# Patient Record
Sex: FEMALE | Race: WHITE | NOT HISPANIC OR LATINO | Employment: FULL TIME | URBAN - METROPOLITAN AREA
[De-identification: names, ages, dates, MRNs, and addresses within clinical notes are randomized per-mention and may not be internally consistent; named-entity substitution may affect disease eponyms.]

---

## 2018-07-10 ENCOUNTER — TELEPHONE (OUTPATIENT)
Dept: ENDOCRINOLOGY | Facility: HOSPITAL | Age: 58
End: 2018-07-10

## 2018-07-10 DIAGNOSIS — E04.1 THYROID NODULE: ICD-10-CM

## 2018-07-10 DIAGNOSIS — E03.9 HYPOTHYROIDISM, ADULT: Primary | ICD-10-CM

## 2018-07-10 NOTE — TELEPHONE ENCOUNTER
Patient needs new lab slip for t4 free, tsh with diagnosis codes     E04 1, E03 9    Mail to patients home

## 2018-11-26 ENCOUNTER — OFFICE VISIT (OUTPATIENT)
Dept: ENDOCRINOLOGY | Facility: HOSPITAL | Age: 58
End: 2018-11-26
Payer: COMMERCIAL

## 2018-11-26 VITALS
HEART RATE: 83 BPM | WEIGHT: 168.4 LBS | HEIGHT: 63 IN | DIASTOLIC BLOOD PRESSURE: 86 MMHG | BODY MASS INDEX: 29.84 KG/M2 | SYSTOLIC BLOOD PRESSURE: 122 MMHG

## 2018-11-26 DIAGNOSIS — E03.9 HYPOTHYROIDISM, ADULT: Primary | ICD-10-CM

## 2018-11-26 DIAGNOSIS — E04.1 THYROID NODULE: ICD-10-CM

## 2018-11-26 PROCEDURE — 99243 OFF/OP CNSLTJ NEW/EST LOW 30: CPT | Performed by: INTERNAL MEDICINE

## 2018-11-26 RX ORDER — MULTIVIT WITH MINERALS/LUTEIN
1000 TABLET ORAL DAILY
COMMUNITY

## 2018-11-26 RX ORDER — MAG HYDROX/ALUMINUM HYD/SIMETH 400-400-40
SUSPENSION, ORAL (FINAL DOSE FORM) ORAL DAILY
COMMUNITY

## 2018-11-26 RX ORDER — LEVOTHYROXINE SODIUM 0.05 MG/1
50 TABLET ORAL
COMMUNITY
Start: 2014-09-18 | End: 2020-04-15 | Stop reason: DRUGHIGH

## 2018-11-26 RX ORDER — CALCIUM CARBONATE/VITAMIN D3 600 MG-10
TABLET ORAL DAILY
COMMUNITY

## 2018-11-26 NOTE — PATIENT INSTRUCTIONS
Thyroid blood work is normal   No change in the levothyroxine 50 mcg daily  Follow up in 1 year with blood work and thyroid ultrasound

## 2018-11-26 NOTE — LETTER
November 26, 2018     [address] [place] [address]    Patient: Felicita Echevarria   YOB: 1960   Date of Visit: 11/26/2018       Dear Dr Rajesh Oakley: Thank you for referring Trinity Del Cid to me for evaluation  Below are my notes for this consultation  If you have questions, please do not hesitate to call me  I look forward to following your patient along with you  Sincerely,        Zhane Canales MD        CC: No Recipients  Zhane Canales MD  11/26/2018 12:11 PM  Sign at close encounter  11/26/2018    Assessment/Plan      Diagnoses and all orders for this visit:    Hypothyroidism, adult  -     TSH, 3rd generation Lab Collect; Future  -     Thyroid Antibodies Panel Lab Collect; Future  -     T4, free Lab Collect; Future  -     TSH, 3rd generation Lab Collect  -     Thyroid Antibodies Panel Lab Collect  -     T4, free Lab Collect  -     US thyroid; Future    Thyroid nodule  -     TSH, 3rd generation Lab Collect; Future  -     Thyroid Antibodies Panel Lab Collect; Future  -     T4, free Lab Collect; Future  -     TSH, 3rd generation Lab Collect  -     Thyroid Antibodies Panel Lab Collect  -     T4, free Lab Collect  -     US thyroid; Future    Other orders  -     DEXILANT 60 MG capsule; Take 1 capsule by mouth daily  -     levothyroxine 50 mcg tablet; Take 50 mcg by mouth  -     Omega 3 1200 MG CAPS; Take by mouth daily    -     Cholecalciferol (VITAMIN D3) 5000 units CAPS; Take by mouth daily    -     Ascorbic Acid (VITAMIN C) 1000 MG tablet; Take 1,000 mg by mouth daily  -     ASTRAGALUS PO; Take 470 mg by mouth 2 (two) times a day    -     MISC NATURAL PRODUCTS PO; Take by mouth Adren-plus supplement once daily  -     TURMERIC CURCUMIN PO; Take 750 Devices by mouth daily  -     MISC NATURAL PRODUCTS PO; Take by mouth oreganix mushrooms once daily        Assessment/Plan:  1  Hypothyroidism    Most recent thyroid function tests are normal  She is biochemically euthyroid  I will have her continue the same levothyroxine 50 mcg daily  2   Small right-sided thyroid nodule  It is been stable in size in the past   I will repeat an ultrasound next year  I have asked her to follow up in 1 year with preceding TSH, free T4, thyroid antibody panel, and thyroid ultrasound  CC: Thyroid Consult    History of Present Illness     HPI: oJse Gonzalez is a 63y o  year old female with history of hypothyroidism and history of small right-sided thyroid nodule  In 2016, she was being evaluated after recent diagnosis of non-Hodgkin's lymphoma and CT and PET scan showed abnormalities of the thyroid for which an ultrasound was then done showing a thyroid nodule  The nodules were very small in size and biopsy was not necessary  She was found to be hypothyroid and is on levothyroxine 50 mcg daily  She denies heat or cold intolerance, diarrhea or constipation, palpitation, tremors, anxiety or depression, or fatigue  She does only get about 5 hours of sleep a night  She has some dry skin and brittle nails but no hair loss  She has gained about 15 lb since November of 2017 though she says she gained most of it over the summer  She denies diplopia  She is no trouble swallowing or compressive thyroid symptoms  She has no history of head or neck irradiation in the past     Review of Systems   Constitutional: Positive for unexpected weight change  Negative for fatigue  Weight up 15 lbs since November 2017  Weight has gone up since summer 2018  HENT: Negative for hearing loss, tinnitus and trouble swallowing  No XRT to head or neck in the past    Eyes: Negative for visual disturbance  Wears glasses  No diplopia  Respiratory: Negative for chest tightness and shortness of breath  Cardiovascular: Negative for chest pain, palpitations and leg swelling  Gastrointestinal: Positive for abdominal pain  Negative for constipation, diarrhea and nausea          Some abdominal pain in epigastric area  Endocrine: Negative for cold intolerance and heat intolerance  Genitourinary:        No menses since age 53  Musculoskeletal: Negative for arthralgias and back pain  Skin: Negative for rash  Has some dry skin  Has some brittle nails  No hair loss  Neurological: Positive for numbness  Negative for dizziness, tremors, light-headedness and headaches  Has numbness and tingling at times in the hands  Some feet pain when gets up in am with numbness and tingling  Psychiatric/Behavioral: Positive for sleep disturbance  Negative for dysphoric mood  The patient is not nervous/anxious  Has poor sleep in general  Has only been able to sleep 5 hours a night         Historical Information   Past Medical History:   Diagnosis Date    GERD (gastroesophageal reflux disease)     Lymphoma, non-Hodgkin's (HCC)     treated with Chemo, Follicular lymphoma, 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    Osteoarthritis      Past Surgical History:   Procedure Laterality Date     SECTION      X 3    KNEE ARTHROSCOPY Bilateral     LYMPH NODE BIOPSY Left     groin    PORTACATH PLACEMENT Left     TONSILLECTOMY      TUBAL LIGATION Bilateral     WISDOM TOOTH EXTRACTION       Social History   History   Alcohol Use    Yes     Comment: Occasional wine     History   Drug Use No     History   Smoking Status    Never Smoker   Smokeless Tobacco    Never Used     Family History:   Family History   Problem Relation Age of Onset    Dementia Mother     Diabetes unspecified Father     Cancer Father         bladder    Parkinsonism Father     Hypothyroidism Father     Neuropathy Father         peripheral    Breast cancer Maternal Grandmother     Thyroid disease unspecified Sister         post thyroidectomy    Arthritis Brother     Colon cancer Paternal Grandmother     Myasthenia gravis Sister     No Known Problems Daughter     No Known Problems Daughter     Arthritis Son Meds/Allergies   Current Outpatient Prescriptions   Medication Sig Dispense Refill    Ascorbic Acid (VITAMIN C) 1000 MG tablet Take 1,000 mg by mouth daily      ASTRAGALUS PO Take 470 mg by mouth 2 (two) times a day        Cholecalciferol (VITAMIN D3) 5000 units CAPS Take by mouth daily        DEXILANT 60 MG capsule Take 1 capsule by mouth daily  11    levothyroxine 50 mcg tablet Take 50 mcg by mouth      MISC NATURAL PRODUCTS PO Take by mouth Adren-plus supplement once daily      MISC NATURAL PRODUCTS PO Take by mouth oreganix mushrooms once daily      Omega 3 1200 MG CAPS Take by mouth daily        TURMERIC CURCUMIN PO Take 750 Devices by mouth daily       No current facility-administered medications for this visit  Allergies   Allergen Reactions    Meperidine      Other reaction(s): Other (See Comments)  Hallucinations    Oxycodone-Acetaminophen      Other reaction(s): GI upset       Objective   Vitals: Blood pressure 122/86, pulse 83, height 5' 3" (1 6 m), weight 76 4 kg (168 lb 6 4 oz)  Invasive Devices          No matching active lines, drains, or airways          Physical Exam   Constitutional: She is oriented to person, place, and time  She appears well-developed and well-nourished  HENT:   Head: Normocephalic and atraumatic  Eyes: Pupils are equal, round, and reactive to light  Conjunctivae and EOM are normal    No lid lag, stare, proptosis, or periorbital edema  Neck: Normal range of motion  Neck supple  No thyromegaly present  No palpable thyroid nodules  Thyroid irregular in feel  No bruits over the thyroid gland or carotids  Cardiovascular: Normal rate, regular rhythm, normal heart sounds and intact distal pulses  No murmur heard  Pulmonary/Chest: Effort normal and breath sounds normal  She has no wheezes  Abdominal: Soft  Bowel sounds are normal  There is no tenderness  Musculoskeletal: Normal range of motion  She exhibits no edema or deformity     No tremor of the outstretched hands  No spinous process tenderness  No CVA tenderness  Lymphadenopathy:     She has no cervical adenopathy  Neurological: She is alert and oriented to person, place, and time  She has normal reflexes  Skin: Skin is warm and dry  No rash noted  Vitals reviewed  The history was obtained from the review of the chart and from the patient  Lab Results:    Blood work done at [facility] on 11/20/2018 showed a TSH of 1 91 with a free T4 1  12  No results found for this or any previous visit (from the past 00906 hour(s))        Future Appointments  Date Time Provider Samia Kauffman   11/26/2019 9:00 AM Daun Spatz, MD [facility]

## 2018-11-26 NOTE — PROGRESS NOTES
11/26/2018    Assessment/Plan      Diagnoses and all orders for this visit:    Hypothyroidism, adult  -     TSH, 3rd generation Lab Collect; Future  -     Thyroid Antibodies Panel Lab Collect; Future  -     T4, free Lab Collect; Future  -     TSH, 3rd generation Lab Collect  -     Thyroid Antibodies Panel Lab Collect  -     T4, free Lab Collect  -     US thyroid; Future    Thyroid nodule  -     TSH, 3rd generation Lab Collect; Future  -     Thyroid Antibodies Panel Lab Collect; Future  -     T4, free Lab Collect; Future  -     TSH, 3rd generation Lab Collect  -     Thyroid Antibodies Panel Lab Collect  -     T4, free Lab Collect  -     US thyroid; Future    Other orders  -     DEXILANT 60 MG capsule; Take 1 capsule by mouth daily  -     levothyroxine 50 mcg tablet; Take 50 mcg by mouth  -     Omega 3 1200 MG CAPS; Take by mouth daily    -     Cholecalciferol (VITAMIN D3) 5000 units CAPS; Take by mouth daily    -     Ascorbic Acid (VITAMIN C) 1000 MG tablet; Take 1,000 mg by mouth daily  -     ASTRAGALUS PO; Take 470 mg by mouth 2 (two) times a day    -     MISC NATURAL PRODUCTS PO; Take by mouth Adren-plus supplement once daily  -     TURMERIC CURCUMIN PO; Take 750 Devices by mouth daily  -     MISC NATURAL PRODUCTS PO; Take by mouth oreganix mushrooms once daily        Assessment/Plan:  1  Hypothyroidism  Most recent thyroid function tests are normal  She is biochemically euthyroid  I will have her continue the same levothyroxine 50 mcg daily  2   Small right-sided thyroid nodule  It is been stable in size in the past   I will repeat an ultrasound next year  I have asked her to follow up in 1 year with preceding TSH, free T4, thyroid antibody panel, and thyroid ultrasound  CC: Thyroid Consult    History of Present Illness     HPI: Salvador Napier is a 63y o  year old female with history of hypothyroidism and history of small right-sided thyroid nodule    In 2016, she was being evaluated after recent diagnosis of non-Hodgkin's lymphoma and CT and PET scan showed abnormalities of the thyroid for which an ultrasound was then done showing a thyroid nodule  The nodules were very small in size and biopsy was not necessary  She was found to be hypothyroid and is on levothyroxine 50 mcg daily  She denies heat or cold intolerance, diarrhea or constipation, palpitation, tremors, anxiety or depression, or fatigue  She does only get about 5 hours of sleep a night  She has some dry skin and brittle nails but no hair loss  She has gained about 15 lb since November of 2017 though she says she gained most of it over the summer  She denies diplopia  She is no trouble swallowing or compressive thyroid symptoms  She has no history of head or neck irradiation in the past     Review of Systems   Constitutional: Positive for unexpected weight change  Negative for fatigue  Weight up 15 lbs since November 2017  Weight has gone up since summer 2018  HENT: Negative for hearing loss, tinnitus and trouble swallowing  No XRT to head or neck in the past    Eyes: Negative for visual disturbance  Wears glasses  No diplopia  Respiratory: Negative for chest tightness and shortness of breath  Cardiovascular: Negative for chest pain, palpitations and leg swelling  Gastrointestinal: Positive for abdominal pain  Negative for constipation, diarrhea and nausea  Some abdominal pain in epigastric area  Endocrine: Negative for cold intolerance and heat intolerance  Genitourinary:        No menses since age 53  Musculoskeletal: Negative for arthralgias and back pain  Skin: Negative for rash  Has some dry skin  Has some brittle nails  No hair loss  Neurological: Positive for numbness  Negative for dizziness, tremors, light-headedness and headaches  Has numbness and tingling at times in the hands  Some feet pain when gets up in am with numbness and tingling     Psychiatric/Behavioral: Positive for sleep disturbance  Negative for dysphoric mood  The patient is not nervous/anxious  Has poor sleep in general  Has only been able to sleep 5 hours a night         Historical Information   Past Medical History:   Diagnosis Date    GERD (gastroesophageal reflux disease)     Lymphoma, non-Hodgkin's (HCC)     treated with Chemo, Follicular lymphoma, 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    Osteoarthritis      Past Surgical History:   Procedure Laterality Date     SECTION      X 3    KNEE ARTHROSCOPY Bilateral     LYMPH NODE BIOPSY Left     groin    PORTACATH PLACEMENT Left     TONSILLECTOMY      TUBAL LIGATION Bilateral     WISDOM TOOTH EXTRACTION       Social History   History   Alcohol Use    Yes     Comment: Occasional wine     History   Drug Use No     History   Smoking Status    Never Smoker   Smokeless Tobacco    Never Used     Family History:   Family History   Problem Relation Age of Onset    Dementia Mother     Diabetes unspecified Father     Cancer Father         bladder    Parkinsonism Father     Hypothyroidism Father     Neuropathy Father         peripheral    Breast cancer Maternal Grandmother     Thyroid disease unspecified Sister         post thyroidectomy    Arthritis Brother     Colon cancer Paternal Grandmother     Myasthenia gravis Sister     No Known Problems Daughter     No Known Problems Daughter     Arthritis Son        Meds/Allergies   Current Outpatient Prescriptions   Medication Sig Dispense Refill    Ascorbic Acid (VITAMIN C) 1000 MG tablet Take 1,000 mg by mouth daily      ASTRAGALUS PO Take 470 mg by mouth 2 (two) times a day        Cholecalciferol (VITAMIN D3) 5000 units CAPS Take by mouth daily        DEXILANT 60 MG capsule Take 1 capsule by mouth daily  11    levothyroxine 50 mcg tablet Take 50 mcg by mouth      MISC NATURAL PRODUCTS PO Take by mouth Adren-plus supplement once daily      MISC NATURAL PRODUCTS PO Take by mouth oreganix mushrooms once daily      Omega 3 1200 MG CAPS Take by mouth daily        TURMERIC CURCUMIN PO Take 750 Devices by mouth daily       No current facility-administered medications for this visit  Allergies   Allergen Reactions    Meperidine      Other reaction(s): Other (See Comments)  Hallucinations    Oxycodone-Acetaminophen      Other reaction(s): GI upset       Objective   Vitals: Blood pressure 122/86, pulse 83, height 5' 3" (1 6 m), weight 76 4 kg (168 lb 6 4 oz)  Invasive Devices          No matching active lines, drains, or airways          Physical Exam   Constitutional: She is oriented to person, place, and time  She appears well-developed and well-nourished  HENT:   Head: Normocephalic and atraumatic  Eyes: Pupils are equal, round, and reactive to light  Conjunctivae and EOM are normal    No lid lag, stare, proptosis, or periorbital edema  Neck: Normal range of motion  Neck supple  No thyromegaly present  No palpable thyroid nodules  Thyroid irregular in feel  No bruits over the thyroid gland or carotids  Cardiovascular: Normal rate, regular rhythm, normal heart sounds and intact distal pulses  No murmur heard  Pulmonary/Chest: Effort normal and breath sounds normal  She has no wheezes  Abdominal: Soft  Bowel sounds are normal  There is no tenderness  Musculoskeletal: Normal range of motion  She exhibits no edema or deformity  No tremor of the outstretched hands  No spinous process tenderness  No CVA tenderness  Lymphadenopathy:     She has no cervical adenopathy  Neurological: She is alert and oriented to person, place, and time  She has normal reflexes  Skin: Skin is warm and dry  No rash noted  Vitals reviewed  The history was obtained from the review of the chart and from the patient  Lab Results:    Blood work done at [facility] on 11/20/2018 showed a TSH of 1 91 with a free T4 1  12        No results found for this or any previous visit (from the past 87128 hour(s))        Future Appointments  Date Time Provider Samia Kauffman   11/26/2019 9:00 AM Cherry Shah MD [facility]

## 2019-12-04 DIAGNOSIS — E03.9 HYPOTHYROIDISM, ADULT: Primary | ICD-10-CM

## 2019-12-04 DIAGNOSIS — E04.1 THYROID NODULE: ICD-10-CM

## 2019-12-09 DIAGNOSIS — E03.9 HYPOTHYROIDISM, ADULT: Primary | ICD-10-CM

## 2019-12-19 ENCOUNTER — OFFICE VISIT (OUTPATIENT)
Dept: ENDOCRINOLOGY | Facility: HOSPITAL | Age: 59
End: 2019-12-19
Payer: COMMERCIAL

## 2019-12-19 VITALS
HEART RATE: 80 BPM | DIASTOLIC BLOOD PRESSURE: 84 MMHG | BODY MASS INDEX: 31.89 KG/M2 | HEIGHT: 63 IN | SYSTOLIC BLOOD PRESSURE: 122 MMHG | WEIGHT: 180 LBS

## 2019-12-19 DIAGNOSIS — E04.1 THYROID NODULE: ICD-10-CM

## 2019-12-19 DIAGNOSIS — E03.9 HYPOTHYROIDISM, ADULT: Primary | ICD-10-CM

## 2019-12-19 PROCEDURE — 99214 OFFICE O/P EST MOD 30 MIN: CPT | Performed by: INTERNAL MEDICINE

## 2019-12-19 RX ORDER — MELOXICAM 15 MG/1
15 TABLET ORAL DAILY
COMMUNITY
End: 2020-12-08 | Stop reason: ALTCHOICE

## 2019-12-19 NOTE — PATIENT INSTRUCTIONS
The thyroid ultrasound shows stable size small thyroid nodules that are not worrisome  the thyroid levels are more on the underactive side of normal   We can try increasing the levothyroxine to 50 mcg 1 tablet 6 days a week and 1 5 tablets on Sunday to see if it helps  Repeat blood work in 3 months  Call about 1 week afterwards for results  Follow up in 1 year with blood work

## 2019-12-19 NOTE — PROGRESS NOTES
12/20/2019    Assessment/Plan      Diagnoses and all orders for this visit:    Hypothyroidism, adult  -     TSH, 3rd generation Lab Collect; Future  -     T4, free Lab Collect; Future  -     TSH, 3rd generation Lab Collect  -     T4, free Lab Collect  -     TSH, 3rd generation Lab Collect; Future  -     T4, free Lab Collect; Future  -     TSH, 3rd generation Lab Collect  -     T4, free Lab Collect    Thyroid nodule  -     TSH, 3rd generation Lab Collect; Future  -     T4, free Lab Collect; Future  -     TSH, 3rd generation Lab Collect  -     T4, free Lab Collect  -     TSH, 3rd generation Lab Collect; Future  -     T4, free Lab Collect; Future  -     TSH, 3rd generation Lab Collect  -     T4, free Lab Collect    Other orders  -     meloxicam (MOBIC) 15 mg tablet; Take 15 mg by mouth daily  -     Magnesium 100 MG CAPS; Take by mouth        Assessment/Plan:  1  Hypothyroidism  Most recent thyroid function tests do show her TSH is on the under active side of normal which has increased some and given her symptoms I will try increasing her levothyroxine to 50 mcg 1 tablet 6 days a week and 1 5 tablets on Sunday  She will repeat her TSH with free T4 in 3 months and call about a week afterwards for results to see if any further adjustments need to be made  2  Thyroid nodule  Thyroid nodules have been too small to biopsy in the past   Most recent thyroid ultrasound does show stable size thyroid nodules  I have asked her to follow up in 1 year with preceding TSH and free T4       CC:  Hypothyroid and thyroid nodule follow-up    History of Present Illness     HPI: Gaurang Nascimento is a 63y o  year old female with history of hypothyroidism and right thyroid nodule that is quite small here for follow-up  In 2016, she was noted to have an abnormal thyroid ultrasound during a workup for non Hodgkin's lymphoma  Nodules were small biopsy was not necessary    Hypothyroidism was found at the same time and she was started on thyroid hormone  She is currently on levothyroxine 50 mcg daily  Weight is 12 lb more than last year  She has some hot flashes  She has dry skin and patches in brittle nails  She wakes after about 6 hours and can get back to sleep  She denies heat or cold intolerance, tremors, palpitation, fatigue, anxiety or depression, diarrhea or constipation, or hair loss  She denies diplopia  She has no compressive thyroid symptoms or difficulties with swallowing  Review of Systems   Constitutional: Positive for unexpected weight change  Negative for fatigue  Weight 12 lb more than last year  HENT: Negative for trouble swallowing  Eyes: Negative for visual disturbance  Wears glasses  Respiratory: Positive for shortness of breath  Negative for chest tightness  More breathless and wheezing with extra weight when exercising  Cardiovascular: Negative for chest pain and palpitations  Gastrointestinal: Negative for abdominal pain, constipation, diarrhea and nausea  Endocrine: Negative for cold intolerance and heat intolerance  Some hot flashes  Genitourinary:        Postmenopausal for 9 years  Skin: Negative for rash  Has dry skin patches  Has brittle nails  No hair loss  Neurological: Positive for numbness  Negative for dizziness, tremors, light-headedness and headaches  Hands numb and tingling with pain, reportedly neck issue, seeing chiropractor  Psychiatric/Behavioral: Negative for dysphoric mood and sleep disturbance  The patient is not nervous/anxious  Only sleeps 6 1/2 hours and then up         Historical Information   Past Medical History:   Diagnosis Date    Achilles tendon disorder, left     GERD (gastroesophageal reflux disease)     Lymphoma, non-Hodgkin's (HCC)     treated with Chemo, Follicular lymphoma, 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    Osteoarthritis      Past Surgical History:   Procedure Laterality Date     SECTION      X 3    KNEE ARTHROSCOPY Bilateral     LYMPH NODE BIOPSY Left     groin    PORTACATH PLACEMENT Left     TONSILLECTOMY      TUBAL LIGATION Bilateral     WISDOM TOOTH EXTRACTION       Social History   Social History     Substance and Sexual Activity   Alcohol Use Yes    Comment: Occasional wine     Social History     Substance and Sexual Activity   Drug Use No     Social History     Tobacco Use   Smoking Status Never Smoker   Smokeless Tobacco Never Used     Family History:   Family History   Problem Relation Age of Onset    Dementia Mother     Diabetes unspecified Father     Cancer Father         bladder    Parkinsonism Father     Hypothyroidism Father     Neuropathy Father         peripheral    Breast cancer Maternal Grandmother     Thyroid disease unspecified Sister         post thyroidectomy    Arthritis Brother     Colon cancer Paternal Grandmother     Myasthenia gravis Sister     No Known Problems Daughter     No Known Problems Daughter     Arthritis Son        Meds/Allergies   Current Outpatient Medications   Medication Sig Dispense Refill    Ascorbic Acid (VITAMIN C) 1000 MG tablet Take 1,000 mg by mouth daily      Cholecalciferol (VITAMIN D3) 5000 units CAPS Take by mouth daily        DEXILANT 60 MG capsule Take 1 capsule by mouth daily  11    levothyroxine 50 mcg tablet Take 50 mcg by mouth      Magnesium 100 MG CAPS Take by mouth      meloxicam (MOBIC) 15 mg tablet Take 15 mg by mouth daily      MISC NATURAL PRODUCTS PO Take by mouth oreganix mushrooms once daily      Omega 3 1200 MG CAPS Take by mouth daily        TURMERIC CURCUMIN PO Take 750 Devices by mouth daily       No current facility-administered medications for this visit  Allergies   Allergen Reactions    Meperidine      Other reaction(s):  Other (See Comments)  Hallucinations    Oxycodone-Acetaminophen      Other reaction(s): GI upset       Objective   Vitals: Blood pressure 122/84, pulse 80, height 5' 3" (1 6 m), weight 81 6 kg (180 lb)  Invasive Devices     None                 Physical Exam   Constitutional: She is oriented to person, place, and time  She appears well-developed and well-nourished  HENT:   Head: Normocephalic and atraumatic  Eyes: Pupils are equal, round, and reactive to light  Conjunctivae and EOM are normal    No lid lag, stare, proptosis, or periorbital edema  Neck: Normal range of motion  Neck supple  No thyromegaly present  Irregular feeling thyroid  No palpable thyroid nodules  No carotid bruits  Cardiovascular: Normal rate, regular rhythm and normal heart sounds  No murmur heard  Pulmonary/Chest: Effort normal and breath sounds normal  She has no wheezes  Musculoskeletal: Normal range of motion  She exhibits no edema or deformity  No tremor of the outstretched hands  Lymphadenopathy:     She has no cervical adenopathy  Neurological: She is alert and oriented to person, place, and time  She has normal reflexes  Deep tendon reflexes normal    Skin: Skin is warm and dry  No rash noted  Vitals reviewed  The history was obtained from the review of the chart and from the patient  Lab Results:    Blood work done at [facility] on 12/09/2019 showed a TSH of 3 19 with a free T4 of 1 17  Thyroid peroxidase antibodies are less than 10 and thyroglobulin antibodies are less than 20  Thyroid ultrasound done a [address] [place] [address] on 12/09/2019 shows a right lobe of the thyroid measuring 4 7 x 1 5 x 1 1 cm and a left lobe of the thyroid measuring 4 x 1 3 x 1 3 cm  There is a 3 mm rim calcified nodule in the lower pole of the right lobe and a posterior mid pole right lobe nodule measuring 8 mm  Both of these nodules are unchanged in size      Future Appointments   Date Time Provider Samia Kauffman   12/10/2020  8:00 AM Cristal Rico MD [facility]

## 2020-04-15 DIAGNOSIS — E03.9 HYPOTHYROIDISM, ADULT: Primary | ICD-10-CM

## 2020-04-15 RX ORDER — LEVOTHYROXINE SODIUM 0.07 MG/1
75 TABLET ORAL DAILY
Qty: 90 TABLET | Refills: 1 | Status: SHIPPED | OUTPATIENT
Start: 2020-04-15 | End: 2020-09-03

## 2020-05-07 ENCOUNTER — EVALUATION (OUTPATIENT)
Dept: PHYSICAL THERAPY | Facility: CLINIC | Age: 60
End: 2020-05-07
Payer: COMMERCIAL

## 2020-05-07 DIAGNOSIS — M76.62 ACHILLES TENDINITIS OF LEFT LOWER EXTREMITY: Primary | ICD-10-CM

## 2020-05-07 PROCEDURE — 97161 PT EVAL LOW COMPLEX 20 MIN: CPT | Performed by: PHYSICAL THERAPIST

## 2020-05-07 PROCEDURE — 97110 THERAPEUTIC EXERCISES: CPT | Performed by: PHYSICAL THERAPIST

## 2020-05-11 ENCOUNTER — OFFICE VISIT (OUTPATIENT)
Dept: PHYSICAL THERAPY | Facility: CLINIC | Age: 60
End: 2020-05-11
Payer: COMMERCIAL

## 2020-05-11 DIAGNOSIS — M76.62 ACHILLES TENDINITIS OF LEFT LOWER EXTREMITY: Primary | ICD-10-CM

## 2020-05-11 PROCEDURE — 97110 THERAPEUTIC EXERCISES: CPT

## 2020-05-11 PROCEDURE — 97140 MANUAL THERAPY 1/> REGIONS: CPT

## 2020-05-14 ENCOUNTER — OFFICE VISIT (OUTPATIENT)
Dept: PHYSICAL THERAPY | Facility: CLINIC | Age: 60
End: 2020-05-14
Payer: COMMERCIAL

## 2020-05-14 DIAGNOSIS — M76.62 ACHILLES TENDINITIS OF LEFT LOWER EXTREMITY: Primary | ICD-10-CM

## 2020-05-14 PROCEDURE — 97110 THERAPEUTIC EXERCISES: CPT

## 2020-05-14 PROCEDURE — 97140 MANUAL THERAPY 1/> REGIONS: CPT

## 2020-05-18 ENCOUNTER — OFFICE VISIT (OUTPATIENT)
Dept: PHYSICAL THERAPY | Facility: CLINIC | Age: 60
End: 2020-05-18
Payer: COMMERCIAL

## 2020-05-18 DIAGNOSIS — M76.62 ACHILLES TENDINITIS OF LEFT LOWER EXTREMITY: Primary | ICD-10-CM

## 2020-05-18 PROCEDURE — 97110 THERAPEUTIC EXERCISES: CPT

## 2020-05-18 PROCEDURE — 97140 MANUAL THERAPY 1/> REGIONS: CPT

## 2020-05-22 ENCOUNTER — OFFICE VISIT (OUTPATIENT)
Dept: PHYSICAL THERAPY | Facility: CLINIC | Age: 60
End: 2020-05-22
Payer: COMMERCIAL

## 2020-05-22 DIAGNOSIS — M76.62 ACHILLES TENDINITIS OF LEFT LOWER EXTREMITY: Primary | ICD-10-CM

## 2020-05-22 PROCEDURE — 97140 MANUAL THERAPY 1/> REGIONS: CPT

## 2020-05-22 PROCEDURE — 97110 THERAPEUTIC EXERCISES: CPT

## 2020-05-27 ENCOUNTER — OFFICE VISIT (OUTPATIENT)
Dept: PHYSICAL THERAPY | Facility: CLINIC | Age: 60
End: 2020-05-27
Payer: COMMERCIAL

## 2020-05-27 DIAGNOSIS — M76.62 ACHILLES TENDINITIS OF LEFT LOWER EXTREMITY: Primary | ICD-10-CM

## 2020-05-27 PROCEDURE — 97140 MANUAL THERAPY 1/> REGIONS: CPT | Performed by: PHYSICAL THERAPIST

## 2020-05-27 PROCEDURE — 97110 THERAPEUTIC EXERCISES: CPT | Performed by: PHYSICAL THERAPIST

## 2020-05-29 ENCOUNTER — OFFICE VISIT (OUTPATIENT)
Dept: PHYSICAL THERAPY | Facility: CLINIC | Age: 60
End: 2020-05-29
Payer: COMMERCIAL

## 2020-05-29 DIAGNOSIS — M76.62 ACHILLES TENDINITIS OF LEFT LOWER EXTREMITY: Primary | ICD-10-CM

## 2020-05-29 PROCEDURE — 97140 MANUAL THERAPY 1/> REGIONS: CPT

## 2020-05-29 PROCEDURE — 97110 THERAPEUTIC EXERCISES: CPT

## 2020-06-01 ENCOUNTER — OFFICE VISIT (OUTPATIENT)
Dept: PHYSICAL THERAPY | Facility: CLINIC | Age: 60
End: 2020-06-01
Payer: COMMERCIAL

## 2020-06-01 DIAGNOSIS — M76.62 ACHILLES TENDINITIS OF LEFT LOWER EXTREMITY: Primary | ICD-10-CM

## 2020-06-01 PROCEDURE — 97140 MANUAL THERAPY 1/> REGIONS: CPT

## 2020-06-01 PROCEDURE — 97110 THERAPEUTIC EXERCISES: CPT

## 2020-06-04 ENCOUNTER — OFFICE VISIT (OUTPATIENT)
Dept: PHYSICAL THERAPY | Facility: CLINIC | Age: 60
End: 2020-06-04
Payer: COMMERCIAL

## 2020-06-04 DIAGNOSIS — M76.62 ACHILLES TENDINITIS OF LEFT LOWER EXTREMITY: Primary | ICD-10-CM

## 2020-06-04 PROCEDURE — 97140 MANUAL THERAPY 1/> REGIONS: CPT | Performed by: PHYSICAL THERAPIST

## 2020-06-04 PROCEDURE — 97110 THERAPEUTIC EXERCISES: CPT | Performed by: PHYSICAL THERAPIST

## 2020-06-08 ENCOUNTER — OFFICE VISIT (OUTPATIENT)
Dept: PHYSICAL THERAPY | Facility: CLINIC | Age: 60
End: 2020-06-08
Payer: COMMERCIAL

## 2020-06-08 DIAGNOSIS — M76.62 ACHILLES TENDINITIS OF LEFT LOWER EXTREMITY: Primary | ICD-10-CM

## 2020-06-08 PROCEDURE — 97140 MANUAL THERAPY 1/> REGIONS: CPT | Performed by: PHYSICAL THERAPIST

## 2020-06-08 PROCEDURE — 97110 THERAPEUTIC EXERCISES: CPT | Performed by: PHYSICAL THERAPIST

## 2020-06-11 ENCOUNTER — OFFICE VISIT (OUTPATIENT)
Dept: PHYSICAL THERAPY | Facility: CLINIC | Age: 60
End: 2020-06-11
Payer: COMMERCIAL

## 2020-06-11 DIAGNOSIS — M76.62 ACHILLES TENDINITIS OF LEFT LOWER EXTREMITY: Primary | ICD-10-CM

## 2020-06-11 PROCEDURE — 97110 THERAPEUTIC EXERCISES: CPT | Performed by: PHYSICAL THERAPIST

## 2020-06-11 PROCEDURE — 97140 MANUAL THERAPY 1/> REGIONS: CPT | Performed by: PHYSICAL THERAPIST

## 2020-06-16 ENCOUNTER — APPOINTMENT (OUTPATIENT)
Dept: PHYSICAL THERAPY | Facility: CLINIC | Age: 60
End: 2020-06-16
Payer: COMMERCIAL

## 2020-06-18 ENCOUNTER — OFFICE VISIT (OUTPATIENT)
Dept: PHYSICAL THERAPY | Facility: CLINIC | Age: 60
End: 2020-06-18
Payer: COMMERCIAL

## 2020-06-18 DIAGNOSIS — M76.62 ACHILLES TENDINITIS OF LEFT LOWER EXTREMITY: Primary | ICD-10-CM

## 2020-06-18 PROCEDURE — 97110 THERAPEUTIC EXERCISES: CPT

## 2020-06-18 PROCEDURE — 97140 MANUAL THERAPY 1/> REGIONS: CPT

## 2020-06-19 ENCOUNTER — APPOINTMENT (OUTPATIENT)
Dept: PHYSICAL THERAPY | Facility: CLINIC | Age: 60
End: 2020-06-19
Payer: COMMERCIAL

## 2020-06-29 ENCOUNTER — APPOINTMENT (OUTPATIENT)
Dept: PHYSICAL THERAPY | Facility: CLINIC | Age: 60
End: 2020-06-29
Payer: COMMERCIAL

## 2020-07-02 ENCOUNTER — OFFICE VISIT (OUTPATIENT)
Dept: PHYSICAL THERAPY | Facility: CLINIC | Age: 60
End: 2020-07-02
Payer: COMMERCIAL

## 2020-07-02 DIAGNOSIS — M76.62 ACHILLES TENDINITIS OF LEFT LOWER EXTREMITY: Primary | ICD-10-CM

## 2020-07-02 PROCEDURE — 97110 THERAPEUTIC EXERCISES: CPT

## 2020-07-02 PROCEDURE — 97140 MANUAL THERAPY 1/> REGIONS: CPT

## 2020-07-02 NOTE — PROGRESS NOTES
Daily Note     Today's date: 2020  Patient name: Mina Lopez  : 1960  MRN: [id number]  Referring provider: Yuliana Thornton DPM  Dx: No diagnosis found  Subjective: Pt reports her L ankle/foot is feeling better  Notes she has not been compliant w/HEP  Pts mother-in-law  recently, and pt and her  are now responsible for his handicapped sister  Pt reports she rolled over her R ankle yesterday  Objective: See treatment diary below      Assessment: Performed exercise program w/o complaint  Restrictions noted during GT to L calf  Tolerated treatment well  Patient would benefit from continued PT      Plan: Continue per plan of care        Precautions: none      Manuals    IASM to L gastroc/achilles 8' DL 8'    8' 8' KT MD   Laser to achilles insertion 4' 4' 4'    4' 4' 4' 4'   Epimenio Labrum MD  MO    3' 3' KT MD                Neuro Re-Ed                                                                                                        Ther Ex             Bike 8' 8' 8'    8' 8' 8' 8'   Wall stretches (gastroc/soleus) 3x30" ea 3x30" ea 3x30"    3x30"  ea 3x30"  ea 30"x3 ea 30"x3 ea   PF stretch on step 3x30" 3x30" 3x30"    3x30" 3x30" 30"x3 30'x3   Eccentric achilles lowering 6"x20 6"x20 6"x20    6"x12 6"x15 6" x20 6"x20                                                       Ther Activity

## 2020-07-06 ENCOUNTER — OFFICE VISIT (OUTPATIENT)
Dept: PHYSICAL THERAPY | Facility: CLINIC | Age: 60
End: 2020-07-06
Payer: COMMERCIAL

## 2020-07-06 DIAGNOSIS — M76.62 ACHILLES TENDINITIS OF LEFT LOWER EXTREMITY: Primary | ICD-10-CM

## 2020-07-06 PROCEDURE — 97140 MANUAL THERAPY 1/> REGIONS: CPT

## 2020-07-06 PROCEDURE — 97110 THERAPEUTIC EXERCISES: CPT

## 2020-07-06 NOTE — PROGRESS NOTES
Daily Note     Today's date: 2020  Patient name: Oc Hurley  : 1960  MRN: [id number]  Referring provider: Marilu Cook DPM  Dx: No diagnosis found  Subjective: "Alright," adding "it's bothering me more "   Objective: See treatment diary below      Assessment: Performed new exercise and remaining ex program w/o increasing symptoms  Less restrictions noted during GT to L calf  Tolerated treatment well  Patient would benefit from continued PT      Plan: Continue per plan of care        Precautions: none      Manuals    IASM to L gastroc/achilles 8' DL 8' 8'   8' 8' KT MD   Laser to achilles insertion 4' 4' 4' 4'   4' 4' 4' 4'   Gwendolyn Castanon MD  MO MO   3' 3' KT MD                Neuro Re-Ed                                                                                                        Ther Ex             Bike 8' 8' 8' 8'   8' 8' 8' 8'   Wall stretches (gastroc/soleus) 3x30" ea 3x30" ea 3x30" 3x30"  ea   3x30"  ea 3x30"  ea 30"x3 ea 30"x3 ea   PF stretch on step 3x30" 3x30" 3x30" 3x30"   3x30" 3x30" 30"x3 30'x3   Eccentric achilles lowering 6"x20 6"x20 6"x20 6"x20   6"x12 6"x15 6" x20 6"x20   Stretch on incline    3x30"                                                Ther Activity

## 2020-07-08 ENCOUNTER — APPOINTMENT (OUTPATIENT)
Dept: PHYSICAL THERAPY | Facility: CLINIC | Age: 60
End: 2020-07-08
Payer: COMMERCIAL

## 2020-09-02 DIAGNOSIS — E03.9 HYPOTHYROIDISM, ADULT: ICD-10-CM

## 2020-09-03 RX ORDER — LEVOTHYROXINE SODIUM 0.07 MG/1
TABLET ORAL
Qty: 90 TABLET | Refills: 3 | Status: SHIPPED | OUTPATIENT
Start: 2020-09-03 | End: 2021-07-26

## 2020-12-08 ENCOUNTER — OFFICE VISIT (OUTPATIENT)
Dept: ENDOCRINOLOGY | Facility: HOSPITAL | Age: 60
End: 2020-12-08
Payer: COMMERCIAL

## 2020-12-08 VITALS
HEIGHT: 63 IN | SYSTOLIC BLOOD PRESSURE: 110 MMHG | DIASTOLIC BLOOD PRESSURE: 72 MMHG | WEIGHT: 181 LBS | HEART RATE: 104 BPM | BODY MASS INDEX: 32.07 KG/M2

## 2020-12-08 DIAGNOSIS — E04.1 THYROID NODULE: ICD-10-CM

## 2020-12-08 DIAGNOSIS — E03.9 HYPOTHYROIDISM, ADULT: Primary | ICD-10-CM

## 2020-12-08 PROCEDURE — 99213 OFFICE O/P EST LOW 20 MIN: CPT | Performed by: INTERNAL MEDICINE

## 2021-07-23 DIAGNOSIS — E03.9 HYPOTHYROIDISM, ADULT: ICD-10-CM

## 2021-07-26 RX ORDER — LEVOTHYROXINE SODIUM 0.07 MG/1
TABLET ORAL
Qty: 90 TABLET | Refills: 3 | Status: SHIPPED | OUTPATIENT
Start: 2021-07-26

## 2021-12-06 ENCOUNTER — OFFICE VISIT (OUTPATIENT)
Dept: ENDOCRINOLOGY | Facility: HOSPITAL | Age: 61
End: 2021-12-06
Payer: COMMERCIAL

## 2021-12-06 VITALS
SYSTOLIC BLOOD PRESSURE: 120 MMHG | DIASTOLIC BLOOD PRESSURE: 80 MMHG | HEART RATE: 84 BPM | WEIGHT: 185 LBS | HEIGHT: 63 IN | BODY MASS INDEX: 32.78 KG/M2

## 2021-12-06 DIAGNOSIS — E03.9 HYPOTHYROIDISM, ADULT: Primary | ICD-10-CM

## 2021-12-06 DIAGNOSIS — E04.1 THYROID NODULE: ICD-10-CM

## 2021-12-06 PROCEDURE — 99213 OFFICE O/P EST LOW 20 MIN: CPT | Performed by: INTERNAL MEDICINE

## 2021-12-10 ENCOUNTER — OFFICE VISIT (OUTPATIENT)
Dept: GASTROENTEROLOGY | Facility: CLINIC | Age: 61
End: 2021-12-10
Payer: COMMERCIAL

## 2021-12-10 VITALS
WEIGHT: 183 LBS | HEIGHT: 63 IN | SYSTOLIC BLOOD PRESSURE: 112 MMHG | BODY MASS INDEX: 32.43 KG/M2 | DIASTOLIC BLOOD PRESSURE: 80 MMHG

## 2021-12-10 DIAGNOSIS — C85.90 NON-HODGKIN'S LYMPHOMA, UNSPECIFIED BODY REGION, UNSPECIFIED NON-HODGKIN LYMPHOMA TYPE (HCC): ICD-10-CM

## 2021-12-10 DIAGNOSIS — Z12.11 COLON CANCER SCREENING: ICD-10-CM

## 2021-12-10 DIAGNOSIS — K21.9 GASTROESOPHAGEAL REFLUX DISEASE WITHOUT ESOPHAGITIS: Primary | ICD-10-CM

## 2021-12-10 DIAGNOSIS — K21.9 GASTROESOPHAGEAL REFLUX DISEASE WITHOUT ESOPHAGITIS: ICD-10-CM

## 2021-12-10 DIAGNOSIS — Z12.11 COLON CANCER SCREENING: Primary | ICD-10-CM

## 2021-12-10 PROCEDURE — 99204 OFFICE O/P NEW MOD 45 MIN: CPT | Performed by: INTERNAL MEDICINE

## 2021-12-10 RX ORDER — SODIUM PICOSULFATE, MAGNESIUM OXIDE, AND ANHYDROUS CITRIC ACID 10; 3.5; 12 MG/160ML; G/160ML; G/160ML
LIQUID ORAL
Qty: 320 ML | Refills: 0 | Status: SHIPPED | OUTPATIENT
Start: 2021-12-10 | End: 2022-01-12 | Stop reason: HOSPADM

## 2022-01-11 ENCOUNTER — NURSE TRIAGE (OUTPATIENT)
Dept: OTHER | Facility: OTHER | Age: 62
End: 2022-01-11

## 2022-01-12 ENCOUNTER — HOSPITAL ENCOUNTER (OUTPATIENT)
Dept: GASTROENTEROLOGY | Facility: AMBULATORY SURGERY CENTER | Age: 62
Discharge: HOME | End: 2022-01-12
Payer: COMMERCIAL

## 2022-01-12 ENCOUNTER — ANESTHESIA (OUTPATIENT)
Dept: GASTROENTEROLOGY | Facility: AMBULATORY SURGERY CENTER | Age: 62
End: 2022-01-12

## 2022-01-12 ENCOUNTER — ANESTHESIA EVENT (OUTPATIENT)
Dept: GASTROENTEROLOGY | Facility: AMBULATORY SURGERY CENTER | Age: 62
End: 2022-01-12

## 2022-01-12 VITALS
SYSTOLIC BLOOD PRESSURE: 131 MMHG | DIASTOLIC BLOOD PRESSURE: 66 MMHG | RESPIRATION RATE: 22 BRPM | TEMPERATURE: 98.2 F | HEART RATE: 64 BPM | OXYGEN SATURATION: 98 %

## 2022-01-12 DIAGNOSIS — Z12.11 COLON CANCER SCREENING: ICD-10-CM

## 2022-01-12 DIAGNOSIS — K21.9 GASTROESOPHAGEAL REFLUX DISEASE WITHOUT ESOPHAGITIS: ICD-10-CM

## 2022-01-12 PROCEDURE — G0121 COLON CA SCRN NOT HI RSK IND: HCPCS | Performed by: INTERNAL MEDICINE

## 2022-01-12 PROCEDURE — 43235 EGD DIAGNOSTIC BRUSH WASH: CPT | Performed by: INTERNAL MEDICINE

## 2022-01-12 RX ORDER — LIDOCAINE HYDROCHLORIDE 10 MG/ML
INJECTION, SOLUTION EPIDURAL; INFILTRATION; INTRACAUDAL; PERINEURAL AS NEEDED
Status: DISCONTINUED | OUTPATIENT
Start: 2022-01-12 | End: 2022-01-12

## 2022-01-12 RX ORDER — SODIUM CHLORIDE, SODIUM LACTATE, POTASSIUM CHLORIDE, CALCIUM CHLORIDE 600; 310; 30; 20 MG/100ML; MG/100ML; MG/100ML; MG/100ML
125 INJECTION, SOLUTION INTRAVENOUS CONTINUOUS
Status: DISCONTINUED | OUTPATIENT
Start: 2022-01-12 | End: 2022-01-16 | Stop reason: HOSPADM

## 2022-01-12 RX ORDER — SODIUM CHLORIDE, SODIUM LACTATE, POTASSIUM CHLORIDE, CALCIUM CHLORIDE 600; 310; 30; 20 MG/100ML; MG/100ML; MG/100ML; MG/100ML
50 INJECTION, SOLUTION INTRAVENOUS CONTINUOUS
Status: DISCONTINUED | OUTPATIENT
Start: 2022-01-12 | End: 2022-01-16 | Stop reason: HOSPADM

## 2022-01-12 RX ORDER — PROPOFOL 10 MG/ML
INJECTION, EMULSION INTRAVENOUS AS NEEDED
Status: DISCONTINUED | OUTPATIENT
Start: 2022-01-12 | End: 2022-01-12

## 2022-01-12 RX ORDER — SODIUM CHLORIDE, SODIUM LACTATE, POTASSIUM CHLORIDE, CALCIUM CHLORIDE 600; 310; 30; 20 MG/100ML; MG/100ML; MG/100ML; MG/100ML
INJECTION, SOLUTION INTRAVENOUS CONTINUOUS PRN
Status: DISCONTINUED | OUTPATIENT
Start: 2022-01-12 | End: 2022-01-12

## 2022-01-12 RX ORDER — LIDOCAINE HYDROCHLORIDE 10 MG/ML
0.5 INJECTION, SOLUTION EPIDURAL; INFILTRATION; INTRACAUDAL; PERINEURAL ONCE AS NEEDED
Status: DISCONTINUED | OUTPATIENT
Start: 2022-01-12 | End: 2022-01-16 | Stop reason: HOSPADM

## 2022-01-12 RX ADMIN — PROPOFOL 20 MG: 10 INJECTION, EMULSION INTRAVENOUS at 13:25

## 2022-01-12 RX ADMIN — SODIUM CHLORIDE, SODIUM LACTATE, POTASSIUM CHLORIDE, CALCIUM CHLORIDE 50 ML/HR: 600; 310; 30; 20 INJECTION, SOLUTION INTRAVENOUS at 12:50

## 2022-01-12 RX ADMIN — PROPOFOL 20 MG: 10 INJECTION, EMULSION INTRAVENOUS at 13:29

## 2022-01-12 RX ADMIN — PROPOFOL 20 MG: 10 INJECTION, EMULSION INTRAVENOUS at 13:19

## 2022-01-12 RX ADMIN — PROPOFOL 20 MG: 10 INJECTION, EMULSION INTRAVENOUS at 13:37

## 2022-01-12 RX ADMIN — PROPOFOL 20 MG: 10 INJECTION, EMULSION INTRAVENOUS at 13:23

## 2022-01-12 RX ADMIN — PROPOFOL 120 MG: 10 INJECTION, EMULSION INTRAVENOUS at 13:14

## 2022-01-12 RX ADMIN — SODIUM CHLORIDE, SODIUM LACTATE, POTASSIUM CHLORIDE, CALCIUM CHLORIDE: 600; 310; 30; 20 INJECTION, SOLUTION INTRAVENOUS at 13:11

## 2022-01-12 RX ADMIN — PROPOFOL 20 MG: 10 INJECTION, EMULSION INTRAVENOUS at 13:21

## 2022-01-12 RX ADMIN — LIDOCAINE HYDROCHLORIDE 80 MG: 10 INJECTION, SOLUTION EPIDURAL; INFILTRATION; INTRACAUDAL; PERINEURAL at 13:14

## 2022-01-12 RX ADMIN — PROPOFOL 20 MG: 10 INJECTION, EMULSION INTRAVENOUS at 13:17

## 2022-01-12 RX ADMIN — PROPOFOL 20 MG: 10 INJECTION, EMULSION INTRAVENOUS at 13:40

## 2022-01-12 RX ADMIN — PROPOFOL 20 MG: 10 INJECTION, EMULSION INTRAVENOUS at 13:34

## 2022-01-12 RX ADMIN — PROPOFOL 20 MG: 10 INJECTION, EMULSION INTRAVENOUS at 13:31

## 2022-01-12 RX ADMIN — PROPOFOL 20 MG: 10 INJECTION, EMULSION INTRAVENOUS at 13:27

## 2022-01-12 NOTE — H&P
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    Osteoarthritis      Past Surgical History:   Procedure Laterality Date    CARPAL TUNNEL RELEASE Bilateral 2020     SECTION      X 3    KNEE ARTHROSCOPY Bilateral     LYMPH NODE BIOPSY Left     groin    PORTACATH PLACEMENT Left     TONSILLECTOMY      TUBAL LIGATION Bilateral     WISDOM TOOTH EXTRACTION       Social History   Social History     Substance and Sexual Activity   Alcohol Use Yes    Comment: Occasional wine     Social History     Substance and Sexual Activity   Drug Use No     Social History     Tobacco Use   Smoking Status Never Smoker   Smokeless Tobacco Never Used     Family History   Problem Relation Age of Onset    Dementia Mother     Diabetes unspecified Father     Cancer Father         bladder    Parkinsonism Father     Hypothyroidism Father     Neuropathy Father         peripheral    Breast cancer Maternal Grandmother     Thyroid disease unspecified Sister         post thyroidectomy    Arthritis Brother     Colon cancer Paternal Grandmother     Myasthenia gravis Sister     No Known Problems Daughter     No Known Problems Daughter     Arthritis Son        Meds/Allergies       Current Outpatient Medications:     Ascorbic Acid (VITAMIN C) 1000 MG tablet    Ascorbic Acid 37198 MG/30ML SOLN    Cholecalciferol (VITAMIN D3) 5000 units CAPS    COLLAGEN PO    DEXILANT 60 MG capsule    levothyroxine 75 mcg tablet    Magnesium 100 MG CAPS    MISC NATURAL PRODUCTS PO    Omega 3 1200 MG CAPS    Sod Picosulfate-Mag Ox-Cit Acd (Clenpiq) 10-3 5-12 MG-GM -GM/160ML SOLN    TURMERIC CURCUMIN PO    Zinc Sulfate (ZINC 15 PO)    Current Facility-Administered Medications:     lactated ringers infusion, 125 mL/hr, Intravenous, Continuous    lactated ringers infusion, 50 mL/hr, Intravenous, Continuous, 50 mL/hr at 01/12/22 1250    lidocaine (PF) (XYLOCAINE-MPF) 1 % injection 0 5 mL, 0 5 mL, Infiltration, Once PRN    Allergies   Allergen Reactions    Meperidine      Other reaction(s): Other (See Comments)  Hallucinations    Oxycodone-Acetaminophen      Other reaction(s): GI upset       Objective     /74   Pulse 79   Temp 98 2 °F (36 8 °C) (Temporal)   Resp 19   SpO2 97%     PHYSICAL EXAM    Gen: NAD AAOx3  Head: Normocephalic, Atraumatic  CV: S1S2 RRR no m/r/g  CHEST: Clear b/l no c/r/w  ABD: soft, +BS NT/ND  EXT: no edema    ASSESSMENT/PLAN:  This is a 62y o  year old female here for EGD and colonoscopy secondary to GERD and colon cancer screening, and she is stable and optimized for her procedure

## 2022-01-12 NOTE — TELEPHONE ENCOUNTER
Regarding: colonoscopy/prep issue  ----- Message from [facility] sent at 1/11/2022  9:17 PM EST -----  "I have a colonoscopy tomorrow and fabio done everything the paper said and its not working, im supposed to be going to the bathroom and i am not, I havent even eaten anything "

## 2022-01-12 NOTE — DISCHARGE INSTRUCTIONS
Hemorrhoids   WHAT YOU NEED TO KNOW:   Hemorrhoids are swollen blood vessels inside your rectum (internal hemorrhoids) or on your anus (external hemorrhoids)  Sometimes a hemorrhoid may prolapse  This means it extends out of your anus  DISCHARGE INSTRUCTIONS:   Seek care immediately if:   · You have severe pain in your rectum or around your anus  · You have severe pain in your abdomen and you are vomiting  · You have bleeding from your anus that soaks through your underwear  Contact your healthcare provider if:   · You have frequent and painful bowel movements  · Your hemorrhoid looks or feels more swollen than usual      · You do not have a bowel movement for 2 days or more  · You see or feel tissue coming through your anus  · You have questions or concerns about your condition or care  Medicines: You may  need any of the following:  · Medicine  may be given to decrease pain, swelling, and itching  The medicine may come as a pad, cream, or ointment  · Stool softeners  help treat or prevent constipation  · NSAIDs , such as ibuprofen, help decrease swelling, pain, and fever  NSAIDs can cause stomach bleeding or kidney problems in certain people  If you take blood thinner medicine, always ask your healthcare provider if NSAIDs are safe for you  Always read the medicine label and follow directions  · Take your medicine as directed  Contact your healthcare provider if you think your medicine is not helping or if you have side effects  Tell him or her if you are allergic to any medicine  Keep a list of the medicines, vitamins, and herbs you take  Include the amounts, and when and why you take them  Bring the list or the pill bottles to follow-up visits  Carry your medicine list with you in case of an emergency  Manage your symptoms:   · Apply ice on your anus for 15 to 20 minutes every hour or as directed  Use an ice pack, or put crushed ice in a plastic bag   Cover it with a towel before you apply it to your anus  Ice helps prevent tissue damage and decreases swelling and pain  · Take a sitz bath  Fill a bathtub with 4 to 6 inches of warm water  You may also use a sitz bath pan that fits inside a toilet bowl  Sit in the sitz bath for 15 minutes  Do this 3 times a day, and after each bowel movement  The warm water can help decrease pain and swelling  · Keep your anal area clean  Gently wash the area with warm water daily  Soap may irritate the area  After a bowel movement, wipe with moist towelettes or wet toilet paper  Dry toilet paper can irritate the area  Prevent hemorrhoids:   · Do not strain to have a bowel movement  Do not sit on the toilet too long  These actions can increase pressure on the tissues in your rectum and anus  · Drink plenty of liquids  Liquids can help prevent constipation  Ask how much liquid to drink each day and which liquids are best for you  · Eat a variety of high-fiber foods  Examples include fruits, vegetables, and whole grains  Ask your healthcare provider how much fiber you need each day  You may need to take a fiber supplement  · Exercise as directed  Exercise, such as walking, may make it easier to have a bowel movement  Ask your healthcare provider to help you create an exercise plan  · Do not have anal sex  Anal sex can weaken the skin around your rectum and anus  · Avoid heavy lifting  This can cause straining and increase your risk for another hemorrhoid  Follow up with your doctor as directed:  Write down your questions so you remember to ask them during your visits  © Copyright [employer] 2021 Information is for End User's use only and may not be sold, redistributed or otherwise used for commercial purposes  All illustrations and images included in CareNotes® are the copyrighted property of A D A M , Inc  or [facility] Helena Rhoades   The above information is an  only   It is not intended as medical advice for individual conditions or treatments  Talk to your doctor, nurse or pharmacist before following any medical regimen to see if it is safe and effective for you  Colonoscopy   WHAT YOU NEED TO KNOW:   A colonoscopy is a procedure to examine the inside of your colon (intestine) with a scope  Polyps or tissue growths may have been removed during your colonoscopy  It is normal to feel bloated and to have some abdominal discomfort  You should be passing gas  If you have hemorrhoids or you had polyps removed, you may have a small amount of bleeding  DISCHARGE INSTRUCTIONS:   Seek care immediately if:   · You have a large amount of bright red blood in your bowel movements  · Your abdomen is hard and firm and you have severe pain  · You have sudden trouble breathing  Contact your healthcare provider if:   · You develop a rash or hives  · You have a fever within 24 hours of your procedure       · You have not had a bowel movement for 3 days after your procedure  · You have questions or concerns about your condition or care  Activity:   · Do not lift, strain, or run  for 3 days after your procedure  · Rest after your procedure  You have been given medicine to relax you  Do not  drive or make important decisions until the day after your procedure  Return to your normal activity as directed  · Relieve gas and discomfort from bloating  by lying on your right side with a heating pad on your abdomen  You may need to take short walks to help the gas move out  Eat small meals until bloating is relieved  If you had polyps removed: For 7 days after your procedure:  · Do not  take aspirin  · Do not  go on long car rides  Follow up with your healthcare provider as directed:  Write down your questions so you remember to ask them during your visits     © 2017 [address] [employer] is for End User's use only and may not be sold, redistributed or otherwise used for commercial purposes  All illustrations and images included in CareNotes® are the copyrighted property of A D A M , Inc  or Duane Gutierrez  The above information is an  only  It is not intended as medical advice for individual conditions or treatments  Talk to your doctor, nurse or pharmacist before following any medical regimen to see if it is safe and effective for you  Gastric Polyps   WHAT YOU NEED TO KNOW:   Gastric polyps are growths that form in the lining of your stomach  They are not cancerous, but certain types of polyps can change into cancer  DISCHARGE INSTRUCTIONS:   Follow up with your healthcare provider as directed: You may need more tests or procedures  Write down any questions so you remember to ask them at your visits  Medicines:   · Medicines may  be given if you have an infection caused by H  pylori bacteria  · Take your medicine as directed  Contact your healthcare provider if you think your medicine is not helping or if you have side effects  Tell him or her if you are allergic to any medicine  Keep a list of the medicines, vitamins, and herbs you take  Include the amounts, and when and why you take them  Bring the list or the pill bottles to follow-up visits  Carry your medicine list with you in case of an emergency  Seek care immediately if:   · You have blood in your vomit  · You have dark bowel movements  · You have severe pain in your abdomen that does not go away after you take medicine  Contact your healthcare provider if:   · You have indigestion that does not go away with treatment  · You vomit after meals  · You have questions or concerns about your condition or care  © Copyright [employer] 2021 Information is for End User's use only and may not be sold, redistributed or otherwise used for commercial purposes   All illustrations and images included in CareNotes® are the copyrighted property of [employer] , Inc  or [facility]  The above information is an  only  It is not intended as medical advice for individual conditions or treatments  Talk to your doctor, nurse or pharmacist before following any medical regimen to see if it is safe and effective for you  Famotidine (By mouth)   Famotidine ([id number])  Treats ulcers, gastroesophageal reflux disease (GERD), and conditions that cause the stomach to produce too much acid  Also treats heartburn caused by acid indigestion  Brand Name(s): Acid Controller, Acid Reducer, [facility] Acid Reducer, Good Sense Acid Reducer, Heartburn Relief, Leader Acid Reducer, Pepcid, Pepcid AC, [employer] Acid Reducer, TopCare Acid Reducer, Zantac 360   There may be other brand names for this medicine  When This Medicine Should Not Be Used: This medicine is not right for everyone  Do not use it if you had an allergic reaction to famotidine or similar medicines  How to Use This Medicine:   Liquid, Tablet, Chewable Tablet, Dissolving Tablet  · Take your medicine as directed  Your dose may need to be changed several times to find what works best for you  · Follow the instructions on the medicine label if you are using this medicine without a prescription  · The chewable tablet must be chewed completely before you swallow it  · If you are using the disintegrating tablet, make sure your hands are dry before you handle it  Do not open the blister pack that contains the tablet until you are ready to take it  Remove the tablet from the blister pack by peeling back the foil, then taking the tablet out  Do not push the tablet through the foil  Place the tablet in your mouth  It should melt within 2 minutes  Swallow after the tablet has melted  · Shake the oral liquid for 5 to 10 seconds before each use  Measure the medicine with a marked measuring spoon or medicine cup  · Missed dose: Take a dose as soon as you remember   If it is almost time for your next dose, wait until then and take a regular dose  Do not take extra medicine to make up for a missed dose  · Store the medicine in a closed container at room temperature, away from heat, moisture, and direct light  Do not freeze the oral liquid  Throw away any unused oral liquid after 1 month  Drugs and Foods to Avoid:   Ask your doctor or pharmacist before using any other medicine, including over-the-counter medicines, vitamins, and herbal products  · Some medicines can affect how famotidine works  Tell your doctor if you are using cefditoren, dasatinib, delavirdine, fosamprenavir, or tizanidine  Warnings While Using This Medicine:   · Tell your doctor if you are pregnant or breastfeeding, or if you have kidney disease, liver disease, or stomach cancer  · This medicine may cause changes in your heart rhythm (including QT prolongation)  · Some brands or forms of this medicine may contain phenylalanine (aspartame)  If you have phenylketonuria (PKU), talk to your doctor before using this medicine  · Call your doctor if your symptoms do not improve or if they get worse  · Your doctor will do lab tests at regular visits to check on the effects of this medicine  Keep all appointments  · Keep all medicine out of the reach of children  Never share your medicine with anyone    Possible Side Effects While Using This Medicine:   Call your doctor right away if you notice any of these side effects:  · Allergic reaction: Itching or hives, swelling in your face or hands, swelling or tingling in your mouth or throat, chest tightness, trouble breathing  · Agitation, confusion, seizures  · Blistering, peeling, red skin rash  · Dark urine or pale stools, yellow eyes or skin  · Fainting, dizziness, lightheadedness  · Fast, pounding, or uneven heartbeat  · Fever, chills, cough, sore throat, body aches  · Unusual bleeding, bruising, or weakness  If you notice these less serious side effects, talk with your doctor: · Constipation, diarrhea, upset stomach  · Headache  · Nausea, vomiting  If you notice other side effects that you think are caused by this medicine, tell your doctor  Call your doctor for medical advice about side effects  You may report side effects to FDA at 0-620-JOV-6856    © Copyright [employer] 2021 Information is for End User's use only and may not be sold, redistributed or otherwise used for commercial purposes  The above information is an  only  It is not intended as medical advice for individual conditions or treatments  Talk to your doctor, nurse or pharmacist before following any medical regimen to see if it is safe and effective for you  Upper Endoscopy   WHAT YOU NEED TO KNOW:   An upper endoscopy is also called an upper gastrointestinal (GI) endoscopy, or an esophagogastroduodenoscopy (EGD)  You may feel bloated, gassy, or have some abdominal discomfort after your procedure  Your throat may be sore for 24 to 36 hours  You may burp or pass gas from air that is still inside your body  DISCHARGE INSTRUCTIONS:   Call 911 for any of the following:   · You have sudden chest pain or trouble breathing  Seek care immediately if:   · You feel dizzy or faint  · You have trouble swallowing  · Your bowel movements are very dark or black  · Your abdomen is hard and firm and you have severe pain  · You vomit blood  Contact your healthcare provider if:   · You feel full or bloated and cannot burp or pass gas  · You have not had a bowel movement for 3 days after your procedure  · You have neck pain  · You have a fever or chills  · You have nausea or are vomiting  · You have a rash or hives  · You have questions or concerns about your endoscopy  Relieve a sore throat:  Suck on throat lozenges or crushed ice  Gargle with a small amount of warm salt water  Mix 1 teaspoon of salt and 1 cup of warm water to make salt water     Relieve gas and discomfort from bloating:  Lie on your right side with a heating pad on your abdomen  Take short walks to help pass gas  Eat small meals until bloating is relieved  Rest after your procedure: You have been given medicine to relax you  Do not  drive or make important decisions until the day after your procedure  Return to your normal activity as directed  You can usually return to work the day after your procedure  Follow up with your healthcare provider as directed:  Write down your questions so you remember to ask them during your visits  © 2017 [employer] [employer] is for End User's use only and may not be sold, redistributed or otherwise used for commercial purposes  All illustrations and images included in CareNotes® are the copyrighted property of A D A M , Inc  or Duane Gutierrez  The above information is an  only  It is not intended as medical advice for individual conditions or treatments  Talk to your doctor, nurse or pharmacist before following any medical regimen to see if it is safe and effective for you

## 2022-01-12 NOTE — TELEPHONE ENCOUNTER
Reason for Disposition   General information question, no triage required and triager able to answer question    Answer Assessment - Initial Assessment Questions  1   REASON FOR CALL or QUESTION: "What is your reason for calling today?" or "How can I best help you?" or "What question do you have that I can help answer?"      "I have only gone to the bathroom twice today, should I be concerned"    Protocols used: INFORMATION ONLY CALL - NO TRIAGE-ADULT-

## 2022-01-12 NOTE — ANESTHESIA PREPROCEDURE EVALUATION
Procedure:  COLONOSCOPY  EGD    Relevant Problems   ANESTHESIA (within normal limits)   (-) History of anesthesia complications      CARDIO   (-) Chest pain   (-) BRUCE (dyspnea on exertion)      ENDO   (+) Hypothyroidism, adult      GI/HEPATIC   (+) GERD (gastroesophageal reflux disease)      HEMATOLOGY   (+) Lymphoma, non-Hodgkin's (HCC)      PULMONARY   (-) Shortness of breath   (-) URI (upper respiratory infection)        Physical Exam    Airway    Mallampati score: I  TM Distance: >3 FB  Neck ROM: full     Dental   No notable dental hx     Cardiovascular      Pulmonary      Other Findings        Anesthesia Plan  ASA Score- 2     Anesthesia Type- IV sedation with anesthesia with ASA Monitors  Additional Monitors:   Airway Plan:           Plan Factors-Exercise tolerance (METS): >4 METS  Chart reviewed  Induction- intravenous  Postoperative Plan-     Informed Consent- Anesthetic plan and risks discussed with patient  I personally reviewed this patient with the CRNA  Discussed and agreed on the Anesthesia Plan with the CRNA  Karrie Nissen

## 2022-01-12 NOTE — ANESTHESIA POSTPROCEDURE EVALUATION
Post-Op Assessment Note    CV Status:  Stable  Pain Score: 0    Pain management: adequate     Mental Status:  Alert and awake   Hydration Status:  Euvolemic   PONV Controlled:  Controlled   Airway Patency:  Patent      Post Op Vitals Reviewed: Yes      Staff: CRNA         No complications documented      BP   108/57   Temp      Pulse  69   Resp   17   SpO2   98%

## 2022-04-12 ENCOUNTER — TELEPHONE (OUTPATIENT)
Dept: GASTROENTEROLOGY | Facility: CLINIC | Age: 62
End: 2022-04-12

## 2022-04-12 NOTE — TELEPHONE ENCOUNTER
Pt left [facility] stating she had a savings card for Dexilant that [facility] gave her but it is ; cannot afford med/asks for new card  # [phone]

## 2022-04-22 NOTE — TELEPHONE ENCOUNTER
I contacted patient and advised we do not have samples  I asked that she contact pcp they may have some or she can purchase OTC PPI (she was on Prevacid in the past) until she receives mail order delivery

## 2022-04-22 NOTE — TELEPHONE ENCOUNTER
Pt left  [facility]ss[facility] stating she is having trouble getting med Dexilant; got a 3-mo supply but mail order won't arrive in time/she is out/needs 1 wk/asks for samples? CB#  [phone]

## 2022-08-26 DIAGNOSIS — E03.9 HYPOTHYROIDISM, ADULT: ICD-10-CM

## 2022-08-26 RX ORDER — LEVOTHYROXINE SODIUM 0.07 MG/1
TABLET ORAL
Qty: 90 TABLET | Refills: 3 | Status: SHIPPED | OUTPATIENT
Start: 2022-08-26

## 2022-10-12 PROBLEM — Z12.11 COLON CANCER SCREENING: Status: RESOLVED | Noted: 2021-12-10 | Resolved: 2022-10-12

## 2022-12-09 ENCOUNTER — OFFICE VISIT (OUTPATIENT)
Dept: ENDOCRINOLOGY | Facility: HOSPITAL | Age: 62
End: 2022-12-09

## 2022-12-09 VITALS
HEIGHT: 63 IN | BODY MASS INDEX: 31.43 KG/M2 | HEART RATE: 78 BPM | WEIGHT: 177.4 LBS | DIASTOLIC BLOOD PRESSURE: 78 MMHG | SYSTOLIC BLOOD PRESSURE: 116 MMHG

## 2022-12-09 DIAGNOSIS — E04.1 THYROID NODULE: ICD-10-CM

## 2022-12-09 DIAGNOSIS — E03.9 HYPOTHYROIDISM, ADULT: Primary | ICD-10-CM

## 2022-12-09 NOTE — PROGRESS NOTES
12/11/2022    Assessment/Plan      Diagnoses and all orders for this visit:    Hypothyroidism, adult  -     TSH, 3rd generation Lab Collect; Future  -     T4, free Lab Collect; Future    Thyroid nodule  -     TSH, 3rd generation Lab Collect; Future  -     T4, free Lab Collect; Future        Assessment/Plan:  1  Hypothyroidism  Most recent thyroid function studies are normal   She is clinically and biochemically euthyroid  She will continue the same levothyroxine 75 mcg daily  2   Nodules  Her thyroid nodules are small and do not need to be followed over time as they are not worrisome  I have asked her to follow-up in 1 year with preceding TSH and free T4       CC: Hypothyroid follow-up    History of Present Illness     HPI: Cheo Galindo is a 65y o  year old female with history of hypothyroidism and right-sided thyroid nodule for follow-up visit  She was noted to have an abnormal thyroid ultrasound in 2016 in the workup for non-Hodgkin's lymphoma  She did not receive radiation therapy for her lymphoma  Nodules were too small to be biopsied at the time  She has been followed with serial ultrasounds and blood work  Last thyroid ultrasound was December 2019  Since the nodules are subcentimeter in size, they do not need to be followed regularly via ultrasounds      Hypothyroidism was noted in the workup in 2016 and she is on thyroid hormone for replacement purposes  She is currently taking levothyroxine 75 mcg daily  Weight is 8 pounds less than last year  She has some winter dry skin and unchanged brittle nails but no hair loss  She denies heat or cold intolerance, palpitation, tremors, insomnia, fatigue, diarrhea or constipation, anxiety or depression  She denies diplopia  She denies compressive thyroid symptoms or difficulties with swallowing  Review of Systems   Constitutional: Negative for fatigue and unexpected weight change  Weight 8 pounds less than last year     HENT: Negative for trouble swallowing  Eyes: Negative for visual disturbance  No diplopia  Wears glasses  Respiratory: Positive for cough  Negative for chest tightness and stridor  Lingering cough post URI  Cardiovascular: Negative for chest pain and palpitations  Gastrointestinal: Negative for abdominal pain, constipation, diarrhea and nausea  Endocrine: Negative for cold intolerance and heat intolerance  Skin: Negative for rash  Some winter dry skin  Brittle nails for life unchanged  No hair loss  Neurological: Negative for dizziness, tremors, light-headedness and headaches  Psychiatric/Behavioral: Negative for dysphoric mood and sleep disturbance  The patient is not nervous/anxious          Historical Information   Past Medical History:   Diagnosis Date   • Achilles tendon disorder, left    • COVID-19 2021   • Disease of thyroid gland    • GERD (gastroesophageal reflux disease)    • Lymphoma, non-Hodgkin's (HCC)     treated with Chemo, Follicular lymphoma, 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   • Osteoarthritis      Past Surgical History:   Procedure Laterality Date   • CARPAL TUNNEL RELEASE Bilateral 2020   •  SECTION      X 3   • KNEE ARTHROSCOPY Bilateral    • LYMPH NODE BIOPSY Left     groin   • PORTACATH PLACEMENT Left    • TONSILLECTOMY     • TUBAL LIGATION Bilateral    • WISDOM TOOTH EXTRACTION       Social History   Social History     Substance and Sexual Activity   Alcohol Use Yes    Comment: Occasional wine     Social History     Substance and Sexual Activity   Drug Use No     Social History     Tobacco Use   Smoking Status Never   Smokeless Tobacco Never     Family History:   Family History   Problem Relation Age of Onset   • Dementia Mother    • Diabetes unspecified Father    • Cancer Father         bladder   • Parkinsonism Father    • Hypothyroidism Father    • Neuropathy Father         peripheral   • Breast cancer Maternal Grandmother    • Thyroid disease unspecified Sister post thyroidectomy   • Arthritis Brother    • Colon cancer Paternal Grandmother    • Myasthenia gravis Sister    • No Known Problems Daughter    • No Known Problems Daughter    • Arthritis Son        Meds/Allergies   Current Outpatient Medications   Medication Sig Dispense Refill   • Ascorbic Acid (VITAMIN C) 1000 MG tablet Take 1,000 mg by mouth daily prn     • Ascorbic Acid 58651 MG/30ML SOLN Infuse into a venous catheter 69834 mg once a month     • Cholecalciferol (VITAMIN D3) 5000 units CAPS Take by mouth daily       • COLLAGEN PO Take by mouth     • DEXILANT 60 MG capsule Take 1 capsule by mouth daily  11   • levothyroxine 75 mcg tablet TAKE 1 TABLET BY MOUTH  DAILY 90 tablet 3   • Magnesium 100 MG CAPS Take by mouth     • MISC NATURAL PRODUCTS PO Take by mouth oreganix mushrooms once daily     • Omega 3 1200 MG CAPS Take by mouth daily       • TURMERIC CURCUMIN PO Take 750 Devices by mouth daily     • Zinc Sulfate (ZINC 15 PO) Take by mouth       No current facility-administered medications for this visit  Allergies   Allergen Reactions   • Meperidine      Other reaction(s): Other (See Comments)  Hallucinations   • Oxycodone-Acetaminophen      Other reaction(s): GI upset       Objective   Vitals: Blood pressure 116/78, pulse 78, height 5' 3" (1 6 m), weight 80 5 kg (177 lb 6 4 oz)  Invasive Devices     None                 Physical Exam  Vitals reviewed  Constitutional:       Appearance: Normal appearance  She is well-developed  HENT:      Head: Normocephalic and atraumatic  Eyes:      Extraocular Movements: Extraocular movements intact  Conjunctiva/sclera: Conjunctivae normal       Comments: No lid lag, stare, proptosis, or periorbital edema  Neck:      Thyroid: No thyromegaly  Vascular: No carotid bruit  Comments: Thyroid normal in size  No palpable thyroid nodules  Cardiovascular:      Rate and Rhythm: Normal rate and regular rhythm        Heart sounds: Normal heart sounds  No murmur heard  Pulmonary:      Effort: Pulmonary effort is normal       Breath sounds: Normal breath sounds  No wheezing  Abdominal:      Palpations: Abdomen is soft  Musculoskeletal:         General: No deformity  Normal range of motion  Cervical back: Normal range of motion and neck supple  Right lower leg: No edema  Left lower leg: No edema  Comments: No tremor of the outstretched hands  Lymphadenopathy:      Cervical: No cervical adenopathy  Skin:     General: Skin is warm and dry  Findings: No rash  Neurological:      Mental Status: She is alert and oriented to person, place, and time  Deep Tendon Reflexes: Reflexes are normal and symmetric  Comments: Patellar deep tendon reflexes normal          The history was obtained from the review of the chart and from the patient  Lab Results:      Blood work done on 12/5/2022 showed a TSH of 2 02 with a free T4 of 1 12          Future Appointments   Date Time Provider Samia Kauffman   1/30/2023  4:30 PM Sarita Wilson MD [facility]   12/15/2023  8:00 AM Nasir Boone MD [facility]

## 2022-12-09 NOTE — PATIENT INSTRUCTIONS
The thyroid blood work is normal     Continue the same levothyroxine 75 mcg daily  Follow up in 1 year with blood work

## 2023-08-01 DIAGNOSIS — E03.9 HYPOTHYROIDISM, ADULT: ICD-10-CM

## 2023-08-01 RX ORDER — LEVOTHYROXINE SODIUM 0.07 MG/1
TABLET ORAL
Qty: 90 TABLET | Refills: 3 | Status: SHIPPED | OUTPATIENT
Start: 2023-08-01

## 2023-12-11 DIAGNOSIS — E04.1 THYROID NODULE: ICD-10-CM

## 2023-12-11 DIAGNOSIS — E03.9 HYPOTHYROIDISM, ADULT: Primary | ICD-10-CM

## 2023-12-15 ENCOUNTER — OFFICE VISIT (OUTPATIENT)
Dept: ENDOCRINOLOGY | Facility: HOSPITAL | Age: 63
End: 2023-12-15
Payer: COMMERCIAL

## 2023-12-15 VITALS
HEIGHT: 63 IN | SYSTOLIC BLOOD PRESSURE: 120 MMHG | DIASTOLIC BLOOD PRESSURE: 82 MMHG | WEIGHT: 182.2 LBS | HEART RATE: 78 BPM | BODY MASS INDEX: 32.28 KG/M2

## 2023-12-15 DIAGNOSIS — E04.1 THYROID NODULE: ICD-10-CM

## 2023-12-15 DIAGNOSIS — E03.9 HYPOTHYROIDISM, ADULT: Primary | ICD-10-CM

## 2023-12-15 PROCEDURE — 99213 OFFICE O/P EST LOW 20 MIN: CPT | Performed by: INTERNAL MEDICINE

## 2023-12-15 NOTE — PROGRESS NOTES
12/15/2023    Assessment/Plan     1. Hypothyroidism, adult  Assessment & Plan:  Most recent thyroid function studies are normal. She is clinically euthyroid and biochemically euthyroid. We will continue the same levothyroxine 75 mcg daily. Orders:  -     T4, free Lab Collect; Future; Expected date: 11/11/2024  -     TSH, 3rd generation Lab Collect; Future; Expected date: 11/11/2024  -     T4, free Lab Collect  -     TSH, 3rd generation Lab Collect    2. Thyroid nodule  Assessment & Plan:  Her nodules have been subcentimeter in size. Her exam demonstrates no palpable nodules, and she has no compressive symptoms. No repeat ultrasound needs to be performed at this time. Orders:  -     T4, free Lab Collect; Future; Expected date: 11/11/2024  -     TSH, 3rd generation Lab Collect; Future; Expected date: 11/11/2024  -     T4, free Lab Collect  -     TSH, 3rd generation Lab Collect         I asked her to follow up in 1 year with preceding TSH and free T4.      CC: Hypothyroid, thyroid nodule follow-up    HPI: Monica Olson is a 81-year-old female with history of hypothyroidism and right-sided thyroid nodule for follow-up visit. She was noted to have an abnormal thyroid ultrasound in 2016 in the workup for non-Hodgkin's lymphoma. She was also noted to have hypothyroidism in the workup. She did not receive radiation therapy for her lymphoma. Nodules were too small to be biopsied at the time. She has been followed with serial ultrasounds and blood work. Last thyroid ultrasound was December 2019. Since the nodules are subcentimeter in size, they do not need to be followed regularly via ultrasounds. She takes levothyroxine 75 mcg 1 tablet daily. She is feeling well and does not feel any difference. She recently completed working as a  for 3 months, which resulted in not eating right and not taking her vitamins regularly. She denies heat or cold intolerance.  She has been having a problem with her weight, but it remained at 178 to 180 pounds. She gained 10 pounds when she went for 5 weeks travel in 2023. She states she tried to exercise but her knees are painful. She does not have tremors, palpitations, chest pain, dyspnea, diarrhea, constipation, abdominal pain, or nausea. She has trouble sleeping because she wakes up at nighttime to urinate. It takes her a while to get back to sleep. She does not feel fatigued during the day if she had 6 to 7 hours of sleep. She denies any anxiety, depression. She has dry skin in winter season and has been applying hand cream daily. She does not have a humidifier in the house. She mentions she never had a good nail. She has no significant hair loss. She denies diplopia or dysphagia. Review of Systems  The pertinent positive and negative findings are as noted in the HPI.     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    Osteoarthritis      Past Surgical History:   Procedure Laterality Date    CARPAL TUNNEL RELEASE Bilateral 2020     SECTION      X 3    KNEE ARTHROSCOPY Bilateral     LYMPH NODE BIOPSY Left     groin    PORTACATH PLACEMENT Left     TONSILLECTOMY      TUBAL LIGATION Bilateral     WISDOM TOOTH EXTRACTION       Social History   Social History     Substance and Sexual Activity   Alcohol Use Yes    Comment: Occasional wine     Social History     Substance and Sexual Activity   Drug Use No     Social History     Tobacco Use   Smoking Status Never   Smokeless Tobacco Never     Family History:   Family History   Problem Relation Age of Onset    Dementia Mother     Diabetes unspecified Father     Cancer Father         bladder    Parkinsonism Father     Hypothyroidism Father     Neuropathy Father peripheral    Breast cancer Maternal Grandmother     Thyroid disease unspecified Sister         post thyroidectomy    Arthritis Brother     Colon cancer Paternal Grandmother     Myasthenia gravis Sister     No Known Problems Daughter     No Known Problems Daughter     Arthritis Son        Meds/Allergies   Current Outpatient Medications   Medication Sig Dispense Refill    Ascorbic Acid (VITAMIN C) 1000 MG tablet Take 1,000 mg by mouth daily prn      Ascorbic Acid 99084 MG/30ML SOLN Infuse into a venous catheter 61064 mg once a month      Cholecalciferol (VITAMIN D3) 5000 units CAPS Take by mouth daily        COLLAGEN PO Take by mouth      DEXILANT 60 MG capsule Take 1 capsule by mouth daily  11    levothyroxine 75 mcg tablet TAKE 1 TABLET BY MOUTH  DAILY 90 tablet 3    Magnesium 100 MG CAPS Take by mouth      MISC NATURAL PRODUCTS PO Take by mouth oreganix mushrooms once daily      Omega 3 1200 MG CAPS Take by mouth daily        TURMERIC CURCUMIN PO Take 750 Devices by mouth daily      Zinc Sulfate (ZINC 15 PO) Take by mouth       No current facility-administered medications for this visit. Allergies   Allergen Reactions    Meperidine      Other reaction(s): Other (See Comments)  Hallucinations    Oxycodone-Acetaminophen      Other reaction(s): GI upset       Objective   Vitals: Blood pressure 120/82, pulse 78, height 5' 3" (1.6 m), weight 82.6 kg (182 lb 3.2 oz). Invasive Devices       None                   Physical Exam    Physical exam normal with pertinent positives and negatives. HEENT: No lid lags, stare, proptosis, or periorbital edema. Thyroid normal in size. No palpable nodules. Respiratory: Lungs clear. Cardiovascular: Heart regular. No murmurs. Neurological: No tremor of the outstretched hands. Patellar deep tendon reflexes normal.    The history was obtained from the review of the chart and from the patient.       Lab Results:        Blood work performed on 12/11/2023 showed a TSH of 0.87 with a free T4 of 1.11. Future Appointments   Date Time Provider [address]   12/23/2024  8:00 AM Oswald Archer MD [facility]       Transcribed for Oswald Archer MD, by Cortez Hollins. Quiana Robb on 12/15/23 at 2:50 PM. Powered by [employer].

## 2023-12-15 NOTE — ASSESSMENT & PLAN NOTE
Her nodules have been subcentimeter in size. Her exam demonstrates no palpable nodules, and she has no compressive symptoms. No repeat ultrasound needs to be performed at this time.

## 2023-12-15 NOTE — PATIENT INSTRUCTIONS
The thyroid blood work is normal.     Continue the same levothyroxine. Follow up in 1 year with blood work.

## 2024-07-02 DIAGNOSIS — E03.9 HYPOTHYROIDISM, ADULT: ICD-10-CM

## 2024-07-03 RX ORDER — LEVOTHYROXINE SODIUM 0.07 MG/1
TABLET ORAL
Qty: 100 TABLET | Refills: 1 | Status: SHIPPED | OUTPATIENT
Start: 2024-07-03

## 2024-09-20 ENCOUNTER — APPOINTMENT (OUTPATIENT)
Dept: URGENT CARE | Facility: CLINIC | Age: 64
End: 2024-09-20

## 2024-11-26 DIAGNOSIS — E03.9 HYPOTHYROIDISM, ADULT: ICD-10-CM

## 2024-11-27 RX ORDER — LEVOTHYROXINE SODIUM 75 UG/1
75 TABLET ORAL DAILY
Qty: 90 TABLET | Refills: 0 | Status: SHIPPED | OUTPATIENT
Start: 2024-11-27

## 2024-12-18 ENCOUNTER — APPOINTMENT (OUTPATIENT)
Dept: URGENT CARE | Facility: CLINIC | Age: 64
End: 2024-12-18

## 2025-01-20 ENCOUNTER — TELEPHONE (OUTPATIENT)
Age: 65
End: 2025-01-20

## 2025-01-20 ENCOUNTER — TELEPHONE (OUTPATIENT)
Dept: GASTROENTEROLOGY | Facility: CLINIC | Age: 65
End: 2025-01-20

## 2025-01-20 DIAGNOSIS — K21.9 GASTROESOPHAGEAL REFLUX DISEASE WITHOUT ESOPHAGITIS: Primary | ICD-10-CM

## 2025-01-20 RX ORDER — OMEPRAZOLE 40 MG/1
40 CAPSULE, DELAYED RELEASE ORAL DAILY
Qty: 90 CAPSULE | Refills: 5 | Status: SHIPPED | OUTPATIENT
Start: 2025-01-20

## 2025-01-20 NOTE — TELEPHONE ENCOUNTER
Patient called requesting refill for omeprazole 40mg. Patient made aware medication was refilled on 1/20/25 for 90 with 5 refills to [facility] pharmacy. Patient instructed to contact the pharmacy to obtain refills of medication. Patient verbalized understanding.

## 2025-01-20 NOTE — TELEPHONE ENCOUNTER
Patients GI provider:  Dr. Benedict    Number to return call: [phone]    Reason for call: Pt called in stating that her ins changed and now she will have to pay $750 for the 3 months of the Dexilant. Pt would like to know if something else can be sent over or recommend something OTC.     Scheduled procedure/appointment date if applicable: Appt  3/6/25

## 2025-01-20 NOTE — TELEPHONE ENCOUNTER
Patient is paying out of pocket. Patient said she have a coupon. Patient upcoming appt is 3/6/25 with Dr. Benedict    Reason for call:   [x] Refill   [] Prior Auth  [] Other:     Office:   [] PCP/Provider -   [x] Specialty/Provider - Gastro    Medication:  DEXILANT 60 MG capsule    Dose/Frequency: Take 1 capsule by mouth daily,     Quantity: 30    [id number] [address] [address]      Does the patient have enough for 3 days?   [] Yes   [] No - Send as HP to POD

## 2025-01-21 RX ORDER — DEXLANSOPRAZOLE 60 MG/1
1 CAPSULE, DELAYED RELEASE ORAL DAILY
Qty: 30 CAPSULE | Refills: 1 | Status: SHIPPED | OUTPATIENT
Start: 2025-01-21

## 2025-01-24 ENCOUNTER — APPOINTMENT (OUTPATIENT)
Dept: URGENT CARE | Facility: CLINIC | Age: 65
End: 2025-01-24

## 2025-02-13 DIAGNOSIS — E03.9 HYPOTHYROIDISM, ADULT: ICD-10-CM

## 2025-02-13 RX ORDER — LEVOTHYROXINE SODIUM 75 UG/1
75 TABLET ORAL DAILY
Qty: 90 TABLET | Refills: 3 | Status: SHIPPED | OUTPATIENT
Start: 2025-02-13

## 2025-02-21 ENCOUNTER — TELEPHONE (OUTPATIENT)
Age: 65
End: 2025-02-21

## 2025-02-21 NOTE — TELEPHONE ENCOUNTER
Pt has appt on 3/5/25 she needs new lab orders. She is going to [facility]- please fax lab orders to [phone]

## 2025-02-25 DIAGNOSIS — E04.1 THYROID NODULE: ICD-10-CM

## 2025-02-25 DIAGNOSIS — E03.9 HYPOTHYROIDISM, ADULT: Primary | ICD-10-CM

## 2025-02-25 NOTE — TELEPHONE ENCOUNTER
The patient was called and made aware the labs have been faxed to hospitals for her and she also requested a copy be emailed to her as well.

## 2025-02-27 ENCOUNTER — RESULTS FOLLOW-UP (OUTPATIENT)
Dept: ENDOCRINOLOGY | Facility: HOSPITAL | Age: 65
End: 2025-02-27

## 2025-02-27 LAB
T4 FREE SERPL-MCNC: 1.59 NG/DL (ref 0.82–1.77)
TSH SERPL DL<=0.005 MIU/L-ACNC: 1.02 UIU/ML (ref 0.45–4.5)

## 2025-03-05 ENCOUNTER — OFFICE VISIT (OUTPATIENT)
Dept: ENDOCRINOLOGY | Facility: HOSPITAL | Age: 65
End: 2025-03-05
Payer: COMMERCIAL

## 2025-03-05 VITALS
SYSTOLIC BLOOD PRESSURE: 130 MMHG | HEIGHT: 63 IN | HEART RATE: 68 BPM | WEIGHT: 183.8 LBS | DIASTOLIC BLOOD PRESSURE: 88 MMHG | BODY MASS INDEX: 32.57 KG/M2

## 2025-03-05 DIAGNOSIS — E03.9 HYPOTHYROIDISM, ADULT: Primary | ICD-10-CM

## 2025-03-05 DIAGNOSIS — E04.1 THYROID NODULE: ICD-10-CM

## 2025-03-05 PROCEDURE — 99214 OFFICE O/P EST MOD 30 MIN: CPT | Performed by: INTERNAL MEDICINE

## 2025-03-05 NOTE — PROGRESS NOTES
3/5/2025    Assessment & Plan      Diagnoses and all orders for this visit:    Hypothyroidism, adult  -     T4, free; Future  -     TSH, 3rd generation; Future  -     T4, free  -     TSH, 3rd generation    Thyroid nodule  -     T4, free; Future  -     TSH, 3rd generation; Future  -     T4, free  -     TSH, 3rd generation        Assessment & Plan  1. Hypothyroidism.  Her thyroid function tests are within normal limits. She will continue her current regimen of levothyroxine 75 mcg daily. A prescription for a 90-day supply with refills has been provided to [facility]. Blood work has been ordered for the upcoming year to monitor her condition.    2. Heel pain.  The differential diagnosis includes either a bone contusion or plantar fasciitis. She has been advised to wear cushioned shoes for support. Additional recommendations include using a towel for stretching exercises, applying ice to the affected area, and maintaining the use of supportive footwear.    I have asked her to follow-up in 1 year with preceding TSH and free T4.        CC: Hypothyroid follow-up    History of Present Illness    HPI: Ruth Leyva is a 65-year-old female with a history of hypothyroidism and a right-sided thyroid nodule, presenting for a follow-up visit.    She was noted to have an abnormal thyroid ultrasound in 2016 during the workup for non-Hodgkin's lymphoma. At that time, hypothyroidism was also identified. She did not receive radiation therapy for lymphoma, and the nodules were too small to be biopsied. She has been followed with serial ultrasounds and blood work over the years, and since the nodules have remained subcentimeter in size, no repeats have been done since 2019.     She continues her regimen of levothyroxine 75 mcg daily. She reports no significant health issues. She does not experience excessive heat or cold sensations, tremors, palpitations, or constipation. She acknowledges having dry skin, which remains  consistent, and hair loss, but notes that her hair is thick. She also reports poor nail health. Her bowel movements are regular. She considers a sleep duration of 6 hours to be sufficient and does not feel excessively tired during the day. She wakes up once or twice at night to use the bathroom but is able to return to sleep. She reports increased fluid intake. She does not experience anxiety or depression, although she admits to feeling anxious about an upcoming trip. She does not have difficulty swallowing or food impaction.     She reports experiencing heel pain, which she attributes to a recent incident involving a ladder. She has a history of Achilles tendon issues on the same side but not specifically in the heel. She manages the pain by wearing cushioned shoes and has undergone physical therapy, which she found extremely painful. She is scheduled for a long drive on Friday and is concerned about the impact of her heel pain.        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    Osteoarthritis      Past Surgical History:   Procedure Laterality Date    CARPAL TUNNEL RELEASE Bilateral 2020     SECTION      X 3    KNEE ARTHROSCOPY Bilateral     LYMPH NODE BIOPSY Left     groin    PORTACATH PLACEMENT Left     TONSILLECTOMY      TUBAL LIGATION Bilateral     WISDOM TOOTH EXTRACTION       Social History   Social History     Substance and Sexual Activity   Alcohol Use Yes    Comment: Occasional wine     Social History     Substance and Sexual Activity   Drug Use No     Social History     Tobacco Use   Smoking Status Never   Smokeless Tobacco Never     Family History:   Family History   Problem Relation Age of Onset    Dementia Mother     Diabetes unspecified Father   "   Cancer Father         bladder    Parkinsonism Father     Hypothyroidism Father     Neuropathy Father         peripheral    Breast cancer Maternal Grandmother     Thyroid disease unspecified Sister         post thyroidectomy    Arthritis Brother     Colon cancer Paternal Grandmother     Myasthenia gravis Sister     No Known Problems Daughter     No Known Problems Daughter     Arthritis Son        Meds/Allergies   Current Outpatient Medications   Medication Sig Dispense Refill    Cholecalciferol (VITAMIN D3) 5000 units CAPS Take by mouth daily        COLLAGEN PO Take by mouth      levothyroxine 75 mcg tablet TAKE 1 TABLET BY MOUTH DAILY 90 tablet 3    Magnesium 100 MG CAPS Take by mouth      MISC NATURAL PRODUCTS PO Take by mouth oreganix mushrooms once daily      Omega 3 1200 MG CAPS Take by mouth daily        omeprazole (PriLOSEC) 40 MG capsule Take 1 capsule (40 mg total) by mouth daily 90 capsule 5    TURMERIC CURCUMIN PO Take 750 Devices by mouth daily       No current facility-administered medications for this visit.     Allergies   Allergen Reactions    Meperidine      Other reaction(s): Other (See Comments)  Hallucinations    Oxycodone-Acetaminophen      Other reaction(s): GI upset       Objective   Vitals: Blood pressure 130/88, pulse 68, height 5' 3\" (1.6 m), weight 83.4 kg (183 lb 12.8 oz).  Invasive Devices       None                   Physical Exam    No lid lag, proptosis, or periorbital edema.  Thyroid is normal in size. No palpable nodules. No lymphadenopathy of the neck.  Lungs are clear to auscultation.  Heart has a regular rate and rhythm. No murmurs.  No tremor of the outstretched hands. Patellar deep tendon reflexes are normal.      The history was obtained from the review of the chart and from the patient.    Lab Results:          Lab Results   Component Value Date    CREATININE 0.97 04/02/2024    CREATININE 0.98 04/10/2023    CREATININE 1.03 07/19/2022    BUN 13 04/02/2024    K 4.2 04/02/2024 "     04/02/2024    CO2 25 04/02/2024     GFR, Calculated   Date Value Ref Range Status   12/21/2020 60 (L) >60 mL/min/1.73m2 Final     Comment:     mL/min per 1.73 square meters                                            Normal Function or Mild Renal    Disease (if clinically at risk):  >or=60  Moderately Decreased:                30-59  Severely Decreased:                  15-29  Renal Failure:                         <15                                            -American GFR: multiply reported GFR by 1.16    Please note that the eGFR is based on the CKD-EPI calculation, and is not intended to be used for drug dosing.                                            Note: Calculated GFR may not be an accurate indicator of renal function if the patient's renal function is not in a steady state.    Ordering Provider: AUSTIN CARTER  Report Copied to : AUSTIN MEZA     eGFRcr   Date Value Ref Range Status   04/02/2024 66 >59 Final         Lab Results   Component Value Date    ALT 34 04/02/2024    AST 30 04/02/2024    ALKPHOS 104 04/02/2024       Lab Results   Component Value Date    TSH 1.020 02/26/2025    FREET4 1.59 02/26/2025             Future Appointments   Date Time Provider Department Center   3/6/2025  8:30 AM Shaheed Benedict MD [facility]   3/23/2026  8:00 AM Xi Bridges MD [facility]

## 2025-03-05 NOTE — PATIENT INSTRUCTIONS
The thyroid blood work is normal.     Continue the same levothyroxine 75 mcg daily.     Follow up in 1 year with blood work.

## 2025-03-06 ENCOUNTER — OFFICE VISIT (OUTPATIENT)
Age: 65
End: 2025-03-06
Payer: COMMERCIAL

## 2025-03-06 VITALS
SYSTOLIC BLOOD PRESSURE: 138 MMHG | HEIGHT: 63 IN | DIASTOLIC BLOOD PRESSURE: 84 MMHG | WEIGHT: 184.2 LBS | BODY MASS INDEX: 32.64 KG/M2

## 2025-03-06 DIAGNOSIS — Z12.11 COLON CANCER SCREENING: ICD-10-CM

## 2025-03-06 DIAGNOSIS — K21.9 GASTROESOPHAGEAL REFLUX DISEASE WITHOUT ESOPHAGITIS: Primary | ICD-10-CM

## 2025-03-06 DIAGNOSIS — C85.90 NON-HODGKIN'S LYMPHOMA, UNSPECIFIED BODY REGION, UNSPECIFIED NON-HODGKIN LYMPHOMA TYPE (HCC): ICD-10-CM

## 2025-03-06 PROCEDURE — 99213 OFFICE O/P EST LOW 20 MIN: CPT | Performed by: INTERNAL MEDICINE

## 2025-03-06 NOTE — PATIENT INSTRUCTIONS
Continue omeprazole 40 mg daily.  Okay to use antacids or Pepcid as needed for breakthrough symptoms.  Follow-up office visit as needed.  If you have any breakthrough symptoms let me know.  Due for follow-up colonoscopy in 2031

## 2025-03-06 NOTE — PROGRESS NOTES
"Name: Ruth Leyva      : 1960      MRN: [id number]  Encounter Provider: Shaheed Benedict MD  Encounter Date: 3/6/2025   Encounter department: [facility] [place]  :  Assessment & Plan  Gastroesophageal reflux disease without esophagitis  Previously doing well on Dexilant 60 mg daily.  Insurance no longer paying for this.  We switched her to omeprazole 40 mg daily and she is doing well on that medication  -Omeprazole 40 mg daily  -Can use antacids or H2 blockers as needed for breakthrough symptoms  -If this stops working and we need to reconsider omeprazole consider getting it imported from [place]       Non-Hodgkin's lymphoma, unspecified body region, unspecified non-Hodgkin lymphoma type (HCC)  Stable       Colon cancer screening  Last colonoscopy 2022.  10-year recall           History of Present Illness   Ruth Leyva is a 65 y.o. female who presents for follow-up secondary to GERD.  She has a long history of GERD and is been doing well on Dexilant 60 mg daily.  She had been on other H2 blockers and PPIs in the past.  Her last endoscopy was 2022.  Unfortunately her insurance company no longer is paying for the Dexilant.  The cost was over $600 for a 3-month supply.  She reached out to the office and we started omeprazole 40 mg daily.  She reports that she has been on this medication and is controlling her heartburn.  She reports an occasional breakthrough episode of heartburn when she eats Tootsie pops but otherwise she can eat and drink what she wants.  She denies any dysphagia, odynophagia, early satiety.  Moving her bowels regularly.  Her weight is stable.  HPI  History obtained from: patient  Review of Systems A complete review of systems is negative other than that noted above in the HPI.       Objective   /84   Ht 5' 3\" (1.6 m)   Wt 83.6 kg (184 lb 3.2 oz)   BMI 32.63 kg/m²     Physical Exam General appearance: alert, appears stated age " and cooperative  Eyes: PERLLA, EOMI, no icterus   Head: Normocephalic, without obvious abnormality, atraumatic  Lungs: clear to auscultation bilaterally  Heart: regular rate and rhythm, S1, S2 normal, no murmur, click, rub or gallop  Abdomen: soft, non-tender; bowel sounds normal; no masses,  no organomegaly  Extremities: extremities normal, atraumatic, no cyanosis or edema  Neurologic: Grossly normal       Lab Results: I personally reviewed relevant lab results.       Results for orders placed during the hospital encounter of 01/12/22    EGD    Impression  Multiple small to medium size fundic gland polyps in the fundus and gastric body  Normal esophagus  Normal duodenum    RECOMMENDATION:  Resume regular diet and medications  Can use Pepcid as needed at bedtime for breakthrough heartburn  Follow up with me in clinic 1 year    Shaheed Benedict MD

## 2025-03-06 NOTE — ASSESSMENT & PLAN NOTE
Previously doing well on Dexilant 60 mg daily.  Insurance no longer paying for this.  We switched her to omeprazole 40 mg daily and she is doing well on that medication  -Omeprazole 40 mg daily  -Can use antacids or H2 blockers as needed for breakthrough symptoms  -If this stops working and we need to reconsider omeprazole consider getting it imported from [place]

## 2025-03-24 ENCOUNTER — TELEPHONE (OUTPATIENT)
Dept: PODIATRY | Facility: CLINIC | Age: 65
End: 2025-03-24

## 2025-03-24 NOTE — TELEPHONE ENCOUNTER
Caller: Ruth Leyva    Doctor and/or Office: Dr. Kaiden Kruger    #: [phone]    Escalation: I went into Ruth's chart to see if she was ever seen by Dr. Kruger.

## 2025-03-25 ENCOUNTER — OFFICE VISIT (OUTPATIENT)
Dept: PODIATRY | Facility: CLINIC | Age: 65
End: 2025-03-25
Payer: COMMERCIAL

## 2025-03-25 VITALS — BODY MASS INDEX: 32.6 KG/M2 | WEIGHT: 184 LBS | HEIGHT: 63 IN

## 2025-03-25 DIAGNOSIS — M79.672 PAIN IN LEFT FOOT: ICD-10-CM

## 2025-03-25 DIAGNOSIS — M72.2 PLANTAR FASCIITIS: Primary | ICD-10-CM

## 2025-03-25 PROCEDURE — 99203 OFFICE O/P NEW LOW 30 MIN: CPT | Performed by: PODIATRIST

## 2025-03-25 RX ORDER — MELOXICAM 15 MG/1
15 TABLET ORAL DAILY
Qty: 30 TABLET | Refills: 1 | Status: SHIPPED | OUTPATIENT
Start: 2025-03-25 | End: 2025-05-24

## 2025-03-25 NOTE — PROGRESS NOTES
Name: Ruth Leyva      : 1960      MRN: [id number]  Encounter Provider: Kaiden Kruger DPM  Encounter Date: 3/25/2025   [facility]  :  Assessment & Plan  Plantar fasciitis  Treatment options for plantar fasciitis discussed with patient in detail.  Recommend starting with a conservative approach of stretching icing and range of motion exercises and a short course of anti-inflammatories.  If we fail to make any progress we will then consider other options such as physical therapy, injections, and different anti-inflammatories.  Rx x-ray left foot to evaluate underlying osseous structures for pathology.  X-rays personally reviewed show small infracalcaneal spur and retrocalcaneal enthesopathy.  Also noted was mild degenerative arthritis of the first MPJ along with hallux valgus deformity.  Icing stretching and range of motion instructions were reviewed.  Rx meloxicam 15 mg once a day with food never on empty stomach, emphasized to patient that should any exacerbation of GERD she should immediately discontinue anti-inflammatories.    Follow-up in approximately 6-8 weeks.  Orders:  •  meloxicam (Mobic) 15 mg tablet; Take 1 tablet (15 mg total) by mouth daily    Pain in left foot  As noted above  Orders:  •  XR foot 3+ vw left; Future        History of Present Illness   HPI  Ruth Leyva is a 65 y.o. female who presents after having 3 weeks of pain from left arch/heel.  Pain after rest and after prolonged walking reported.  Denies any injury.  History obtained from: patient    Review of Systems   Constitutional: Negative.    HENT: Negative.     Eyes: Negative.    Respiratory: Negative.     Cardiovascular: Negative.    Gastrointestinal: Negative.    Endocrine: Negative.    Genitourinary: Negative.    Musculoskeletal:         Painful left heel for 3 weeks   Skin: Negative.    Allergic/Immunologic: Negative.    Neurological: Negative.    Hematological: Negative.     Psychiatric/Behavioral: Negative.       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     Past Surgical History:   Procedure Laterality Date   • ABDOMINAL SURGERY      C sections   • CARPAL TUNNEL RELEASE Bilateral 2020   •  SECTION      X 3   • EGD AND COLONOSCOPY  2022   • KNEE ARTHROSCOPY Bilateral    • LYMPH NODE BIOPSY Left     groin   • PORTACATH PLACEMENT Left    • TONSILLECTOMY     • TUBAL LIGATION Bilateral    • WISDOM TOOTH EXTRACTION       Family History   Problem Relation Age of Onset   • Dementia Mother    • Arthritis Mother    • Diabetes unspecified Father    • Cancer Father         Bladder   • Parkinsonism Father    • Hypothyroidism Father    • Neuropathy Father         peripheral   • Breast cancer Maternal Grandmother    • Thyroid disease unspecified Sister         post thyroidectomy   • Arthritis Brother    • Colon cancer Paternal Grandmother    • Myasthenia gravis Sister    • No Known Problems Daughter    • No Known Problems Daughter    • Arthritis Son    • Heart disease Maternal Grandfather       reports that she has never smoked. She has never been exposed to tobacco smoke. She has never used smokeless tobacco. She reports current alcohol use of about 1.0 standard drink of alcohol per week. She reports that she does not use drugs.  Current Outpatient Medications   Medication Instructions   • Cholecalciferol (VITAMIN D3) 5000 units CAPS Daily   • COLLAGEN PO Take by mouth   • levothyroxine 75 mcg, Oral, Daily   • Magnesium 100 MG CAPS Take by mouth   • meloxicam (MOBIC) 15 mg, Oral, Daily   • MISC NATURAL PRODUCTS PO Take  "by mouth oreganix mushrooms once daily   • Omega 3 1200 MG CAPS Daily   • omeprazole (PRILOSEC) 40 mg, Oral, Daily   • TURMERIC CURCUMIN  Devices, Daily     Allergies   Allergen Reactions   • Meperidine      Other reaction(s): Other (See Comments)  Hallucinations   • Oxycodone-Acetaminophen      Other reaction(s): GI upset         Objective   Ht 5' 3\" (1.6 m) Comment: stated  Wt 83.5 kg (184 lb)   BMI 32.59 kg/m²      Physical Exam  Vitals and nursing note reviewed.   Constitutional:       General: She is not in acute distress.     Appearance: Normal appearance. She is well-developed.   HENT:      Head: Normocephalic and atraumatic.      Nose: Nose normal.   Eyes:      Conjunctiva/sclera: Conjunctivae normal.   Cardiovascular:      Rate and Rhythm: Normal rate and regular rhythm.   Pulmonary:      Effort: Pulmonary effort is normal. No respiratory distress.   Musculoskeletal:         General: Tenderness present. No swelling.      Cervical back: Neck supple.      Comments:   Left foot: Moderate pain with palpation plantar medial insertion of plantar fascia on the medial calcaneal tubercle and then along the medial plantar fascial margin approximately 2 to 3 cm distal to insertion.  No edema or erythema noted.  No pain with subtalar joint or ankle joint range of motion.   Feet:      Right foot:      Protective Sensation: 10 sites tested.  10 sites sensed.      Skin integrity: Skin integrity normal.      Left foot:      Protective Sensation: 10 sites tested.  10 sites sensed.      Skin integrity: Skin integrity normal.   Skin:     General: Skin is warm and dry.      Capillary Refill: Capillary refill takes 2 to 3 seconds.   Neurological:      General: No focal deficit present.      Mental Status: She is alert.   Psychiatric:         Mood and Affect: Mood normal.           "

## 2025-04-14 ENCOUNTER — TELEPHONE (OUTPATIENT)
Age: 65
End: 2025-04-14

## 2025-04-14 NOTE — TELEPHONE ENCOUNTER
Caller: Ruth Leyva    Doctor and/or Office: Dr. Kruger/Laura    #: [phone]    Escalation: Patient is requesting a forced appt for an injection this week. She has been doing everything the dr has told her to do. The inflammation pill is not helping. She is in a ton of pain. Please return call. Thank you

## 2025-04-22 ENCOUNTER — OFFICE VISIT (OUTPATIENT)
Dept: PODIATRY | Facility: CLINIC | Age: 65
End: 2025-04-22
Payer: COMMERCIAL

## 2025-04-22 VITALS — HEIGHT: 63 IN | BODY MASS INDEX: 32.59 KG/M2

## 2025-04-22 DIAGNOSIS — M79.672 PAIN IN LEFT FOOT: ICD-10-CM

## 2025-04-22 DIAGNOSIS — M72.2 PLANTAR FASCIITIS: Primary | ICD-10-CM

## 2025-04-22 PROCEDURE — 20550 NJX 1 TENDON SHEATH/LIGAMENT: CPT | Performed by: PODIATRIST

## 2025-04-22 RX ORDER — DEXAMETHASONE SODIUM PHOSPHATE 4 MG/ML
2 INJECTION, SOLUTION INTRA-ARTICULAR; INTRALESIONAL; INTRAMUSCULAR; INTRAVENOUS; SOFT TISSUE ONCE
Status: COMPLETED | OUTPATIENT
Start: 2025-04-22 | End: 2025-04-22

## 2025-04-22 RX ORDER — LIDOCAINE HYDROCHLORIDE 10 MG/ML
1 INJECTION, SOLUTION INFILTRATION; PERINEURAL ONCE
Status: COMPLETED | OUTPATIENT
Start: 2025-04-22 | End: 2025-04-22

## 2025-04-22 RX ORDER — BUPIVACAINE HYDROCHLORIDE 5 MG/ML
1 INJECTION, SOLUTION PERINEURAL ONCE
Status: COMPLETED | OUTPATIENT
Start: 2025-04-22 | End: 2025-04-22

## 2025-04-22 RX ORDER — TRIAMCINOLONE ACETONIDE 40 MG/ML
10 INJECTION, SUSPENSION INTRA-ARTICULAR; INTRAMUSCULAR ONCE
Status: COMPLETED | OUTPATIENT
Start: 2025-04-22 | End: 2025-04-22

## 2025-04-22 RX ADMIN — TRIAMCINOLONE ACETONIDE 10 MG: 40 INJECTION, SUSPENSION INTRA-ARTICULAR; INTRAMUSCULAR at 10:34

## 2025-04-22 RX ADMIN — LIDOCAINE HYDROCHLORIDE 1 ML: 10 INJECTION, SOLUTION INFILTRATION; PERINEURAL at 10:34

## 2025-04-22 RX ADMIN — DEXAMETHASONE SODIUM PHOSPHATE 2 MG: 4 INJECTION, SOLUTION INTRA-ARTICULAR; INTRALESIONAL; INTRAMUSCULAR; INTRAVENOUS; SOFT TISSUE at 10:34

## 2025-04-22 RX ADMIN — BUPIVACAINE HYDROCHLORIDE 1 ML: 5 INJECTION, SOLUTION PERINEURAL at 10:33

## 2025-04-22 NOTE — PROGRESS NOTES
:  Assessment & Plan  Plantar fasciitis left foot  Pain has not subsided any since last evaluation and now she presents requesting injection with corticosteroid.  Treatment options discussed with patient, recommend she continue icing stretching and anti-inflammatories for now in conjunction with corticosteroid injection.  Injection with corticosteroid as noted below  Follow-up as scheduled.    Orders:  •  Foot/lower extremity injection  •  dexamethasone (DECADRON) injection 2 mg  •  triamcinolone acetonide (KENALOG-40) 40 mg/mL injection 10 mg  •  lidocaine (XYLOCAINE) 1 % injection 1 mL  •  bupivacaine (MARCAINE) 0.5 % injection 1 mL    Pain in left foot         Foot/lower extremity injection    Performed by: Kaiden Kruger DPM  Authorized by: Kaiden Kruger DPM    Procedure:     Other Assisting Provider: No      Verbal consent obtained?: Yes      Risks and benefits: Risks, benefits and alternatives were discussed      Consent given by:  Patient    Time out: Immediately prior to the procedure a time out was called      Patient states understanding of procedure being performed: Yes      Site marked: Yes      Patient identity confirmed:  Verbally with patient    Supporting Documentation:     Indications:  Pain    Procedure Details:    Prep: patient was prepped and draped in usual sterile fashion                Ethyl Chloride was applied      Needle size: 25 G G    Ultrasound Guidance: no      Approach:  Medial    Laterality:  Left    Cyst Aspiration/Injection: No      Location: aponeurosis      Aponeurosis Structures: Plantar fascia origin      Injection Information:       Patient tolerance:  Patient tolerated the procedure well with no immediate complications    Dressing: sterile dressing applied         History of Present Illness     Ruth Leyva is a 65 y.o. female   4/22/2025: 65-year-old female seen today for follow-up painful left heel.  Reports pain has been unrelenting since last visit and has not  "improved at all.  Patient request injection with corticosteroid today.    3/25/2025: Ruth Leyva is a 65 y.o. female who presents after having 3 weeks of pain from left arch/heel.  Pain after rest and after prolonged walking reported.  Denies any injury.      Review of Systems   Constitutional: Negative.    HENT: Negative.     Eyes: Negative.    Respiratory: Negative.     Cardiovascular: Negative.    Gastrointestinal: Negative.    Endocrine: Negative.    Genitourinary: Negative.    Musculoskeletal:         Painful left heel for 3 weeks   Skin: Negative.    Allergic/Immunologic: Negative.    Neurological: Negative.    Hematological: Negative.    Psychiatric/Behavioral: Negative.       Objective   Ht 5' 3\" (1.6 m) Comment: stated  BMI 32.59 kg/m²      Physical Exam  Vitals and nursing note reviewed.   Constitutional:       General: She is not in acute distress.     Appearance: Normal appearance. She is well-developed.   HENT:      Head: Normocephalic and atraumatic.      Nose: Nose normal.   Eyes:      Conjunctiva/sclera: Conjunctivae normal.   Cardiovascular:      Rate and Rhythm: Normal rate and regular rhythm.   Pulmonary:      Effort: Pulmonary effort is normal. No respiratory distress.   Musculoskeletal:         General: Tenderness present. No swelling.      Cervical back: Neck supple.      Comments:   Left foot: Moderate pain with palpation plantar medial insertion of plantar fascia on the medial calcaneal tubercle and then along the medial plantar fascial margin approximately 2 to 3 cm distal to insertion.  No edema or erythema noted.  No pain with subtalar joint or ankle joint range of motion.   Feet:      Right foot:      Protective Sensation: 10 sites tested.  10 sites sensed.      Skin integrity: Skin integrity normal.      Left foot:      Protective Sensation: 10 sites tested.  10 sites sensed.      Skin integrity: Skin integrity normal.   Skin:     General: Skin is warm and dry.      Capillary Refill: " Capillary refill takes 2 to 3 seconds.   Neurological:      General: No focal deficit present.      Mental Status: She is alert.   Psychiatric:         Mood and Affect: Mood normal.

## 2025-04-22 NOTE — LETTER
"PLANTAR FASCIITIS & HEEL PAIN  TENDONITIS: Achilles,  Posterior Tibial,  Peroneal,  Other……  \"Physical Therapy @ Home\" is absolutely necessary to obtain, and sustain a long lasting resolution of your heel pain or tendonitis.  If you perform the following you can greatly accelerate your recovery and reduce the chance of a recurrence, which is not uncommon.  Don't get frustrated it sometimes takes months to resolve 100%    The longer you have had this problem the longer it takes to resolve it…    MEDICATION:   If prescribed take as instructed with food, never on an empty stomach.  Stop taking if you have any side effects. (ie.Rash, GI upset, Acid Reflux/Heartburn)  Motrin/Ibuprofen or ALEVE/Naproxen   Rx NSAIDS (ie. Mobic, Diclofenac, Celebrex etc)  ICE:   Apply daily for 10 min. intervals, waiting 5 min. before next application.   Three applications over 45 min. should be done after work, or immediately following an athletic/strenuous event.  Frozen water bottles can be used to ice and massage rolling on floor.  No heat unless instructed.  STRETCHING:  Any stretching activity of  your Achilles Tendon/Calf muscles in the lower leg will also stretch your Plantar Fasciia.    Hold stretch for 8-10 sec., Repeat 10-12 times per setting, for (5-6) times per day.    Stand arms length away from a wall, place foot to be stretched half a shoe length behind the other, then step forward with the other performing a lazy push-up against the wall.  MASSAGE:  Deep friction technique using moisturizer for 10-15 min. daily.  Circles/Figure Eight's with Toes reversing direction for Tendonitis  ORTHOTICS:   Can be very helpful but varies based on foot structure   Custom or basic off the shelf products.  Many options available.  Powerstep [place] is an effective OTC device ([employer]).  PROPER SHOE SELECTION:  Shoes must have adequate support and structural integrity.  Forget loafers, flip-flops, and slip on flats!  Lace up type shoes are " recommended due to their ability to fit orthotics/arch supports.  Some athletic type sandals are acceptable.  Running type sneakers are the best choice.   (Vincee, New Balance 1540 or 940, ASICS, Terrell Adrenaline GTS)  GENERAL ADVICE:   If it hurts slow down or stop the activity.  Avoid certain activities (jumping, climbing ladders, steep inclines/uneven terrain)    Put foot through range of motion if tight prior to getting up after inactivity/resting/sleeping.    Distance running and Treadmills can aggravate the situation and prolong recovery.  Bicycle/Elliptical is better than Jogging/Running.

## 2025-05-12 ENCOUNTER — OFFICE VISIT (OUTPATIENT)
Dept: PODIATRY | Facility: CLINIC | Age: 65
End: 2025-05-12
Payer: COMMERCIAL

## 2025-05-12 VITALS — BODY MASS INDEX: 32.78 KG/M2 | HEIGHT: 63 IN | WEIGHT: 185 LBS

## 2025-05-12 DIAGNOSIS — M79.672 PAIN IN LEFT FOOT: ICD-10-CM

## 2025-05-12 DIAGNOSIS — M72.2 PLANTAR FASCIITIS: Primary | ICD-10-CM

## 2025-05-12 PROCEDURE — 99213 OFFICE O/P EST LOW 20 MIN: CPT | Performed by: PODIATRIST

## 2025-05-12 NOTE — PROGRESS NOTES
":  Assessment & Plan  Plantar fasciitis left foot  Emphasized to patient the importance that she continue stretching and icing as needed.  Stretching should be done daily regardless of pain level.  Continue with good supportive athletic type shoes.    Follow-up as needed.       Pain in left foot             History of Present Illness     Ruth Leyva is a 65 y.o. female   5/12/2025: 65-year-old female seen today for follow-up reports left heel pain has almost completely resolved.    4/22/2025: 65-year-old female seen today for follow-up painful left heel.  Reports pain has been unrelenting since last visit and has not improved at all.  Patient request injection with corticosteroid today.    3/25/2025: Ruth Leyva is a 65 y.o. female who presents after having 3 weeks of pain from left arch/heel.  Pain after rest and after prolonged walking reported.  Denies any injury.      Review of Systems   Constitutional: Negative.    HENT: Negative.     Eyes: Negative.    Respiratory: Negative.     Cardiovascular: Negative.    Gastrointestinal: Negative.    Endocrine: Negative.    Genitourinary: Negative.    Musculoskeletal:         Much less heel pain   Skin: Negative.    Allergic/Immunologic: Negative.    Neurological: Negative.    Hematological: Negative.    Psychiatric/Behavioral: Negative.       Objective   Ht 5' 3\" (1.6 m) Comment: verbal  Wt 83.9 kg (185 lb)   BMI 32.77 kg/m²      Physical Exam  Vitals and nursing note reviewed.   Constitutional:       General: She is not in acute distress.     Appearance: Normal appearance. She is well-developed.   HENT:      Head: Normocephalic and atraumatic.      Nose: Nose normal.   Eyes:      Conjunctiva/sclera: Conjunctivae normal.   Cardiovascular:      Rate and Rhythm: Normal rate and regular rhythm.   Pulmonary:      Effort: Pulmonary effort is normal. No respiratory distress.   Musculoskeletal:         General: Tenderness present. No swelling.      Cervical back: Neck " supple.      Comments: Mild pain with palpation left heel.   Feet:      Right foot:      Protective Sensation: 10 sites tested.  10 sites sensed.      Left foot:      Protective Sensation: 10 sites tested.  10 sites sensed.   Skin:     General: Skin is warm and dry.      Capillary Refill: Capillary refill takes 2 to 3 seconds.   Neurological:      General: No focal deficit present.      Mental Status: She is alert.   Psychiatric:         Mood and Affect: Mood normal.